# Patient Record
Sex: FEMALE | Race: BLACK OR AFRICAN AMERICAN | Employment: UNEMPLOYED | URBAN - METROPOLITAN AREA
[De-identification: names, ages, dates, MRNs, and addresses within clinical notes are randomized per-mention and may not be internally consistent; named-entity substitution may affect disease eponyms.]

---

## 2019-07-19 ENCOUNTER — APPOINTMENT (OUTPATIENT)
Dept: ULTRASOUND IMAGING | Age: 39
End: 2019-07-19
Payer: COMMERCIAL

## 2019-07-19 ENCOUNTER — HOSPITAL ENCOUNTER (EMERGENCY)
Age: 39
Discharge: HOME | End: 2019-07-19
Attending: EMERGENCY MEDICINE
Payer: COMMERCIAL

## 2019-07-19 ENCOUNTER — APPOINTMENT (OUTPATIENT)
Dept: CT IMAGING | Age: 39
End: 2019-07-19
Payer: COMMERCIAL

## 2019-07-19 VITALS
BODY MASS INDEX: 23.37 KG/M2 | RESPIRATION RATE: 16 BRPM | DIASTOLIC BLOOD PRESSURE: 78 MMHG | HEART RATE: 90 BPM | WEIGHT: 127 LBS | HEIGHT: 62 IN | TEMPERATURE: 98.1 F | SYSTOLIC BLOOD PRESSURE: 120 MMHG | OXYGEN SATURATION: 97 %

## 2019-07-19 DIAGNOSIS — R11.0 NAUSEA: ICD-10-CM

## 2019-07-19 DIAGNOSIS — R10.2 PELVIC PAIN: Primary | ICD-10-CM

## 2019-07-19 LAB
A/G RATIO: 1.1 (ref 1.1–2.2)
ALBUMIN SERPL-MCNC: 4.4 G/DL (ref 3.4–5)
ALP BLD-CCNC: 68 U/L (ref 40–129)
ALT SERPL-CCNC: 55 U/L (ref 10–40)
ANION GAP SERPL CALCULATED.3IONS-SCNC: 14 MMOL/L (ref 3–16)
AST SERPL-CCNC: 92 U/L (ref 15–37)
BASOPHILS ABSOLUTE: 0 K/UL (ref 0–0.2)
BASOPHILS RELATIVE PERCENT: 0.7 %
BILIRUB SERPL-MCNC: 1 MG/DL (ref 0–1)
BILIRUBIN URINE: ABNORMAL
BLOOD, URINE: NEGATIVE
BUN BLDV-MCNC: 7 MG/DL (ref 7–20)
CALCIUM SERPL-MCNC: 9.3 MG/DL (ref 8.3–10.6)
CHLORIDE BLD-SCNC: 102 MMOL/L (ref 99–110)
CLARITY: ABNORMAL
CO2: 22 MMOL/L (ref 21–32)
COLOR: ABNORMAL
CREAT SERPL-MCNC: <0.5 MG/DL (ref 0.6–1.1)
EOSINOPHILS ABSOLUTE: 0.1 K/UL (ref 0–0.6)
EOSINOPHILS RELATIVE PERCENT: 1.1 %
EPITHELIAL CELLS, UA: 3 /HPF (ref 0–5)
GFR AFRICAN AMERICAN: >60
GFR NON-AFRICAN AMERICAN: >60
GLOBULIN: 3.9 G/DL
GLUCOSE BLD-MCNC: 85 MG/DL (ref 70–99)
GLUCOSE URINE: NEGATIVE MG/DL
HCG(URINE) PREGNANCY TEST: NEGATIVE
HCT VFR BLD CALC: 41.3 % (ref 36–48)
HEMOGLOBIN: 13.4 G/DL (ref 12–16)
HYALINE CASTS: 8 /LPF (ref 0–8)
KETONES, URINE: ABNORMAL MG/DL
LEUKOCYTE ESTERASE, URINE: ABNORMAL
LYMPHOCYTES ABSOLUTE: 2.1 K/UL (ref 1–5.1)
LYMPHOCYTES RELATIVE PERCENT: 35.7 %
MCH RBC QN AUTO: 32.3 PG (ref 26–34)
MCHC RBC AUTO-ENTMCNC: 32.5 G/DL (ref 31–36)
MCV RBC AUTO: 99.3 FL (ref 80–100)
MICROSCOPIC EXAMINATION: YES
MONOCYTES ABSOLUTE: 0.4 K/UL (ref 0–1.3)
MONOCYTES RELATIVE PERCENT: 6 %
NEUTROPHILS ABSOLUTE: 3.4 K/UL (ref 1.7–7.7)
NEUTROPHILS RELATIVE PERCENT: 56.5 %
NITRITE, URINE: NEGATIVE
PDW BLD-RTO: 19.4 % (ref 12.4–15.4)
PH UA: 6.5 (ref 5–8)
PLATELET # BLD: 205 K/UL (ref 135–450)
PMV BLD AUTO: 8.4 FL (ref 5–10.5)
POTASSIUM SERPL-SCNC: 5.6 MMOL/L (ref 3.5–5.1)
PROTEIN UA: ABNORMAL MG/DL
RBC # BLD: 4.16 M/UL (ref 4–5.2)
RBC UA: 3 /HPF (ref 0–4)
SODIUM BLD-SCNC: 138 MMOL/L (ref 136–145)
SPECIFIC GRAVITY UA: 1.02 (ref 1–1.03)
TOTAL PROTEIN: 8.3 G/DL (ref 6.4–8.2)
URINE REFLEX TO CULTURE: YES
URINE TYPE: ABNORMAL
UROBILINOGEN, URINE: 1 E.U./DL
WBC # BLD: 6 K/UL (ref 4–11)
WBC UA: 10 /HPF (ref 0–5)

## 2019-07-19 PROCEDURE — 74177 CT ABD & PELVIS W/CONTRAST: CPT

## 2019-07-19 PROCEDURE — 6360000002 HC RX W HCPCS: Performed by: NURSE PRACTITIONER

## 2019-07-19 PROCEDURE — 81001 URINALYSIS AUTO W/SCOPE: CPT

## 2019-07-19 PROCEDURE — 87086 URINE CULTURE/COLONY COUNT: CPT

## 2019-07-19 PROCEDURE — 93975 VASCULAR STUDY: CPT

## 2019-07-19 PROCEDURE — 96361 HYDRATE IV INFUSION ADD-ON: CPT

## 2019-07-19 PROCEDURE — 6360000004 HC RX CONTRAST MEDICATION: Performed by: NURSE PRACTITIONER

## 2019-07-19 PROCEDURE — 99284 EMERGENCY DEPT VISIT MOD MDM: CPT

## 2019-07-19 PROCEDURE — 80053 COMPREHEN METABOLIC PANEL: CPT

## 2019-07-19 PROCEDURE — 85025 COMPLETE CBC W/AUTO DIFF WBC: CPT

## 2019-07-19 PROCEDURE — 96374 THER/PROPH/DIAG INJ IV PUSH: CPT

## 2019-07-19 PROCEDURE — 2580000003 HC RX 258: Performed by: NURSE PRACTITIONER

## 2019-07-19 PROCEDURE — 84703 CHORIONIC GONADOTROPIN ASSAY: CPT

## 2019-07-19 PROCEDURE — 96375 TX/PRO/DX INJ NEW DRUG ADDON: CPT

## 2019-07-19 PROCEDURE — 76830 TRANSVAGINAL US NON-OB: CPT

## 2019-07-19 PROCEDURE — 87077 CULTURE AEROBIC IDENTIFY: CPT

## 2019-07-19 RX ORDER — MORPHINE SULFATE 4 MG/ML
4 INJECTION, SOLUTION INTRAMUSCULAR; INTRAVENOUS
Status: DISCONTINUED | OUTPATIENT
Start: 2019-07-19 | End: 2019-07-20 | Stop reason: HOSPADM

## 2019-07-19 RX ORDER — KETOROLAC TROMETHAMINE 30 MG/ML
30 INJECTION, SOLUTION INTRAMUSCULAR; INTRAVENOUS ONCE
Status: COMPLETED | OUTPATIENT
Start: 2019-07-19 | End: 2019-07-19

## 2019-07-19 RX ORDER — 0.9 % SODIUM CHLORIDE 0.9 %
1000 INTRAVENOUS SOLUTION INTRAVENOUS ONCE
Status: COMPLETED | OUTPATIENT
Start: 2019-07-19 | End: 2019-07-19

## 2019-07-19 RX ORDER — DIPHENHYDRAMINE HYDROCHLORIDE 50 MG/ML
50 INJECTION INTRAMUSCULAR; INTRAVENOUS ONCE
Status: COMPLETED | OUTPATIENT
Start: 2019-07-19 | End: 2019-07-19

## 2019-07-19 RX ORDER — METOCLOPRAMIDE HYDROCHLORIDE 5 MG/ML
10 INJECTION INTRAMUSCULAR; INTRAVENOUS ONCE
Status: COMPLETED | OUTPATIENT
Start: 2019-07-19 | End: 2019-07-19

## 2019-07-19 RX ORDER — ONDANSETRON 2 MG/ML
4 INJECTION INTRAMUSCULAR; INTRAVENOUS EVERY 6 HOURS PRN
Status: DISCONTINUED | OUTPATIENT
Start: 2019-07-19 | End: 2019-07-20 | Stop reason: HOSPADM

## 2019-07-19 RX ORDER — ONDANSETRON 4 MG/1
4 TABLET, FILM COATED ORAL EVERY 8 HOURS PRN
Qty: 20 TABLET | Refills: 0 | Status: ON HOLD | OUTPATIENT
Start: 2019-07-19 | End: 2021-01-21 | Stop reason: HOSPADM

## 2019-07-19 RX ADMIN — DIPHENHYDRAMINE HYDROCHLORIDE 50 MG: 50 INJECTION, SOLUTION INTRAMUSCULAR; INTRAVENOUS at 20:07

## 2019-07-19 RX ADMIN — SODIUM CHLORIDE 1000 ML: 9 INJECTION, SOLUTION INTRAVENOUS at 18:56

## 2019-07-19 RX ADMIN — IOPAMIDOL 75 ML: 755 INJECTION, SOLUTION INTRAVENOUS at 19:52

## 2019-07-19 RX ADMIN — KETOROLAC TROMETHAMINE 30 MG: 30 INJECTION, SOLUTION INTRAMUSCULAR; INTRAVENOUS at 18:57

## 2019-07-19 RX ADMIN — ONDANSETRON 4 MG: 2 INJECTION INTRAMUSCULAR; INTRAVENOUS at 18:57

## 2019-07-19 RX ADMIN — METOCLOPRAMIDE 10 MG: 5 INJECTION, SOLUTION INTRAMUSCULAR; INTRAVENOUS at 20:07

## 2019-07-19 ASSESSMENT — ENCOUNTER SYMPTOMS
NAUSEA: 1
DIARRHEA: 0
CHEST TIGHTNESS: 0
SHORTNESS OF BREATH: 0
VOMITING: 0
ABDOMINAL PAIN: 0

## 2019-07-19 ASSESSMENT — PAIN SCALES - GENERAL: PAINLEVEL_OUTOF10: 7

## 2019-07-19 NOTE — ED PROVIDER NOTES
colic. Negative McBurney's point and not likely to be appendicitis. No LLQ abdominal pain that would indicate diverticulitis. There is no evidence of peritonitis, sepsis or toxicity at this time. Patient is advised to follow-up with her family doctor in 24-48 hours and return to the emergency department with any concerns. FINAL IMPRESSION      1. Pelvic pain    2. Nausea          DISPOSITION/PLAN   DISPOSITION Decision To Discharge 07/19/2019 09:34:23 PM      PATIENT REFERREDTO:  No follow-up provider specified.     DISCHARGE MEDICATIONS:  Discharge Medication List as of 7/19/2019  9:44 PM      START taking these medications    Details   ondansetron (ZOFRAN) 4 MG tablet Take 1 tablet by mouth every 8 hours as needed for Nausea, Disp-20 tablet, R-0Print             DISCONTINUED MEDICATIONS:  Discharge Medication List as of 7/19/2019  9:44 PM                 (Please note that portions ofthis note were completed with a voice recognition program.  Efforts were made to edit the dictations but occasionally words are mis-transcribed.)    SHUBHAM Hodges CNP (electronically signed)           SHUBHAM Hodges CNP  07/20/19 8991

## 2019-07-19 NOTE — ED NOTES
Bed: 05  Expected date:   Expected time:   Means of arrival: Walk In  Comments:     Neena Evans  07/19/19 7173

## 2019-07-20 NOTE — ED PROVIDER NOTES
dose to as low as reasonably achievable. COMPARISON: 09/24/2012 HISTORY: ORDERING SYSTEM PROVIDED HISTORY: right lower quad pain TECHNOLOGIST PROVIDED HISTORY: If patient is on cardiac monitor and/or pulse ox, they may be taken off cardiac monitor and pulse ox, left on O2 if currently on. All monitors reattached when patient returns to room. Additional Contrast?->None Reason for Exam: Abdominal Pain (pt with c/o lower abd pain/ nausea, poor appetite ongoing for past 2 weeks- no diarrhea, no fever) Acuity: Acute Type of Exam: Initial FINDINGS: Lower Chest: Lung bases clear Organs: Pronounced low-attenuation of the liver. Remaining solid organs and gallbladder unremarkable GI/Bowel: No gastrointestinal abnormality demonstrated. Appendix normal Pelvis: Reproductive organs within normal limits, with 1 cm irregular shaped rim enhancing right ovarian follicle compatible with recent ovulation. Urinary bladder unremarkable. No free pelvic fluid Peritoneum/Retroperitoneum: No ascites or pneumoperitoneum. Aorta unremarkable Bones/Soft Tissues: No bony abnormality     1. No acute process demonstrated 2.  Hepatic steatosis     Results for orders placed or performed during the hospital encounter of 07/19/19   Urine, reflex to culture   Result Value Ref Range    Color, UA DK YELLOW Straw/Yellow    Clarity, UA CLOUDY (A) Clear    Glucose, Ur Negative Negative mg/dL    Bilirubin Urine SMALL (A) Negative    Ketones, Urine TRACE (A) Negative mg/dL    Specific Gravity, UA 1.025 1.005 - 1.030    Blood, Urine Negative Negative    pH, UA 6.5 5.0 - 8.0    Protein, UA TRACE (A) Negative mg/dL    Urobilinogen, Urine 1.0 <2.0 E.U./dL    Nitrite, Urine Negative Negative    Leukocyte Esterase, Urine SMALL (A) Negative    Microscopic Examination YES     Urine Reflex to Culture Yes     Urine Type Not Specified    Pregnancy, Urine   Result Value Ref Range    HCG(Urine) Pregnancy Test Negative Detects HCG level >20 MIU/mL   Microscopic Urinalysis   Result Value Ref Range    Hyaline Casts, UA 8 0 - 8 /LPF    WBC, UA 10 (H) 0 - 5 /HPF    RBC, UA 3 0 - 4 /HPF    Epi Cells 3 0 - 5 /HPF   CBC Auto Differential   Result Value Ref Range    WBC 6.0 4.0 - 11.0 K/uL    RBC 4.16 4.00 - 5.20 M/uL    Hemoglobin 13.4 12.0 - 16.0 g/dL    Hematocrit 41.3 36.0 - 48.0 %    MCV 99.3 80.0 - 100.0 fL    MCH 32.3 26.0 - 34.0 pg    MCHC 32.5 31.0 - 36.0 g/dL    RDW 19.4 (H) 12.4 - 15.4 %    Platelets 058 718 - 136 K/uL    MPV 8.4 5.0 - 10.5 fL    Neutrophils % 56.5 %    Lymphocytes % 35.7 %    Monocytes % 6.0 %    Eosinophils % 1.1 %    Basophils % 0.7 %    Neutrophils # 3.4 1.7 - 7.7 K/uL    Lymphocytes # 2.1 1.0 - 5.1 K/uL    Monocytes # 0.4 0.0 - 1.3 K/uL    Eosinophils # 0.1 0.0 - 0.6 K/uL    Basophils # 0.0 0.0 - 0.2 K/uL   Comprehensive metabolic panel   Result Value Ref Range    Sodium 138 136 - 145 mmol/L    Potassium 5.6 (H) 3.5 - 5.1 mmol/L    Chloride 102 99 - 110 mmol/L    CO2 22 21 - 32 mmol/L    Anion Gap 14 3 - 16    Glucose 85 70 - 99 mg/dL    BUN 7 7 - 20 mg/dL    CREATININE <0.5 (L) 0.6 - 1.1 mg/dL    GFR Non-African American >60 >60    GFR African American >60 >60    Calcium 9.3 8.3 - 10.6 mg/dL    Total Protein 8.3 (H) 6.4 - 8.2 g/dL    Alb 4.4 3.4 - 5.0 g/dL    Albumin/Globulin Ratio 1.1 1.1 - 2.2    Total Bilirubin 1.0 0.0 - 1.0 mg/dL    Alkaline Phosphatase 68 40 - 129 U/L    ALT 55 (H) 10 - 40 U/L    AST 92 (H) 15 - 37 U/L    Globulin 3.9 g/dL           Laquita Hides, MD  07/19/19 9631

## 2019-07-22 LAB — URINE CULTURE, ROUTINE: NORMAL

## 2020-11-12 ENCOUNTER — APPOINTMENT (OUTPATIENT)
Dept: CT IMAGING | Age: 40
End: 2020-11-12
Payer: COMMERCIAL

## 2020-11-12 ENCOUNTER — HOSPITAL ENCOUNTER (EMERGENCY)
Age: 40
Discharge: HOME | End: 2020-11-13
Attending: STUDENT IN AN ORGANIZED HEALTH CARE EDUCATION/TRAINING PROGRAM
Payer: COMMERCIAL

## 2020-11-12 ENCOUNTER — APPOINTMENT (OUTPATIENT)
Dept: ULTRASOUND IMAGING | Age: 40
End: 2020-11-12
Payer: COMMERCIAL

## 2020-11-12 LAB
A/G RATIO: 1.4 (ref 1.1–2.2)
ALBUMIN SERPL-MCNC: 4.1 G/DL (ref 3.4–5)
ALP BLD-CCNC: 68 U/L (ref 40–129)
ALT SERPL-CCNC: 69 U/L (ref 10–40)
ANION GAP SERPL CALCULATED.3IONS-SCNC: 12 MMOL/L (ref 3–16)
AST SERPL-CCNC: 136 U/L (ref 15–37)
BACTERIA: ABNORMAL /HPF
BASOPHILS ABSOLUTE: 0 K/UL (ref 0–0.2)
BASOPHILS RELATIVE PERCENT: 0.6 %
BILIRUB SERPL-MCNC: 0.6 MG/DL (ref 0–1)
BILIRUBIN URINE: ABNORMAL
BLOOD, URINE: NEGATIVE
BUN BLDV-MCNC: 11 MG/DL (ref 7–20)
CALCIUM SERPL-MCNC: 9.5 MG/DL (ref 8.3–10.6)
CHLORIDE BLD-SCNC: 102 MMOL/L (ref 99–110)
CLARITY: ABNORMAL
CO2: 24 MMOL/L (ref 21–32)
COLOR: ABNORMAL
CREAT SERPL-MCNC: 0.7 MG/DL (ref 0.6–1.1)
EOSINOPHILS ABSOLUTE: 0.1 K/UL (ref 0–0.6)
EOSINOPHILS RELATIVE PERCENT: 1.9 %
EPITHELIAL CELLS, UA: 7 /HPF (ref 0–5)
GFR AFRICAN AMERICAN: >60
GFR NON-AFRICAN AMERICAN: >60
GLOBULIN: 2.9 G/DL
GLUCOSE BLD-MCNC: 93 MG/DL (ref 70–99)
GLUCOSE URINE: NEGATIVE MG/DL
HCG QUALITATIVE: NEGATIVE
HCT VFR BLD CALC: 35.4 % (ref 36–48)
HEMOGLOBIN: 12.2 G/DL (ref 12–16)
HYALINE CASTS: 17 /LPF (ref 0–8)
KETONES, URINE: 40 MG/DL
LEUKOCYTE ESTERASE, URINE: ABNORMAL
LIPASE: 16 U/L (ref 13–60)
LYMPHOCYTES ABSOLUTE: 1.1 K/UL (ref 1–5.1)
LYMPHOCYTES RELATIVE PERCENT: 21.5 %
MCH RBC QN AUTO: 32.4 PG (ref 26–34)
MCHC RBC AUTO-ENTMCNC: 34.4 G/DL (ref 31–36)
MCV RBC AUTO: 94 FL (ref 80–100)
MICROSCOPIC EXAMINATION: YES
MONOCYTES ABSOLUTE: 0.4 K/UL (ref 0–1.3)
MONOCYTES RELATIVE PERCENT: 8.1 %
NEUTROPHILS ABSOLUTE: 3.4 K/UL (ref 1.7–7.7)
NEUTROPHILS RELATIVE PERCENT: 67.9 %
NITRITE, URINE: NEGATIVE
PDW BLD-RTO: 21.6 % (ref 12.4–15.4)
PH UA: 6.5 (ref 5–8)
PLATELET # BLD: 208 K/UL (ref 135–450)
PMV BLD AUTO: 7.8 FL (ref 5–10.5)
POTASSIUM SERPL-SCNC: 3.5 MMOL/L (ref 3.5–5.1)
PROTEIN UA: 30 MG/DL
RBC # BLD: 3.77 M/UL (ref 4–5.2)
RBC UA: 5 /HPF (ref 0–4)
SODIUM BLD-SCNC: 138 MMOL/L (ref 136–145)
SPECIFIC GRAVITY UA: >1.03 (ref 1–1.03)
TOTAL PROTEIN: 7 G/DL (ref 6.4–8.2)
URINE REFLEX TO CULTURE: YES
URINE TYPE: ABNORMAL
UROBILINOGEN, URINE: 1 E.U./DL
WBC # BLD: 5 K/UL (ref 4–11)
WBC UA: 25 /HPF (ref 0–5)

## 2020-11-12 PROCEDURE — 81001 URINALYSIS AUTO W/SCOPE: CPT

## 2020-11-12 PROCEDURE — 76830 TRANSVAGINAL US NON-OB: CPT

## 2020-11-12 PROCEDURE — 6360000002 HC RX W HCPCS: Performed by: PHYSICIAN ASSISTANT

## 2020-11-12 PROCEDURE — 87077 CULTURE AEROBIC IDENTIFY: CPT

## 2020-11-12 PROCEDURE — 84703 CHORIONIC GONADOTROPIN ASSAY: CPT

## 2020-11-12 PROCEDURE — 96374 THER/PROPH/DIAG INJ IV PUSH: CPT

## 2020-11-12 PROCEDURE — 87086 URINE CULTURE/COLONY COUNT: CPT

## 2020-11-12 PROCEDURE — 2580000003 HC RX 258: Performed by: PHYSICIAN ASSISTANT

## 2020-11-12 PROCEDURE — 85025 COMPLETE CBC W/AUTO DIFF WBC: CPT

## 2020-11-12 PROCEDURE — 83690 ASSAY OF LIPASE: CPT

## 2020-11-12 PROCEDURE — 99283 EMERGENCY DEPT VISIT LOW MDM: CPT

## 2020-11-12 PROCEDURE — 96375 TX/PRO/DX INJ NEW DRUG ADDON: CPT

## 2020-11-12 PROCEDURE — 87186 SC STD MICRODIL/AGAR DIL: CPT

## 2020-11-12 PROCEDURE — 96361 HYDRATE IV INFUSION ADD-ON: CPT

## 2020-11-12 PROCEDURE — 80053 COMPREHEN METABOLIC PANEL: CPT

## 2020-11-12 RX ORDER — ONDANSETRON 2 MG/ML
4 INJECTION INTRAMUSCULAR; INTRAVENOUS ONCE
Status: COMPLETED | OUTPATIENT
Start: 2020-11-12 | End: 2020-11-12

## 2020-11-12 RX ORDER — KETOROLAC TROMETHAMINE 30 MG/ML
30 INJECTION, SOLUTION INTRAMUSCULAR; INTRAVENOUS ONCE
Status: COMPLETED | OUTPATIENT
Start: 2020-11-12 | End: 2020-11-13

## 2020-11-12 RX ORDER — 0.9 % SODIUM CHLORIDE 0.9 %
1000 INTRAVENOUS SOLUTION INTRAVENOUS ONCE
Status: COMPLETED | OUTPATIENT
Start: 2020-11-12 | End: 2020-11-13

## 2020-11-12 RX ORDER — MORPHINE SULFATE 4 MG/ML
4 INJECTION, SOLUTION INTRAMUSCULAR; INTRAVENOUS ONCE
Status: COMPLETED | OUTPATIENT
Start: 2020-11-12 | End: 2020-11-12

## 2020-11-12 RX ADMIN — ONDANSETRON 4 MG: 2 INJECTION INTRAMUSCULAR; INTRAVENOUS at 23:59

## 2020-11-12 RX ADMIN — MORPHINE SULFATE 4 MG: 4 INJECTION INTRAVENOUS at 23:53

## 2020-11-12 RX ADMIN — SODIUM CHLORIDE 1000 ML: 9 INJECTION, SOLUTION INTRAVENOUS at 23:50

## 2020-11-12 ASSESSMENT — ENCOUNTER SYMPTOMS
VOMITING: 0
ABDOMINAL PAIN: 1
NAUSEA: 1
WHEEZING: 0
RHINORRHEA: 0
DIARRHEA: 0
SHORTNESS OF BREATH: 0
COUGH: 0

## 2020-11-12 ASSESSMENT — PAIN SCALES - GENERAL
PAINLEVEL_OUTOF10: 8
PAINLEVEL_OUTOF10: 8

## 2020-11-13 ENCOUNTER — APPOINTMENT (OUTPATIENT)
Dept: CT IMAGING | Age: 40
End: 2020-11-13
Payer: COMMERCIAL

## 2020-11-13 VITALS
DIASTOLIC BLOOD PRESSURE: 58 MMHG | BODY MASS INDEX: 23.92 KG/M2 | HEART RATE: 69 BPM | OXYGEN SATURATION: 100 % | RESPIRATION RATE: 14 BRPM | HEIGHT: 62 IN | TEMPERATURE: 99 F | SYSTOLIC BLOOD PRESSURE: 118 MMHG | WEIGHT: 130 LBS

## 2020-11-13 PROCEDURE — 96375 TX/PRO/DX INJ NEW DRUG ADDON: CPT

## 2020-11-13 PROCEDURE — 6360000002 HC RX W HCPCS: Performed by: PHYSICIAN ASSISTANT

## 2020-11-13 PROCEDURE — 6360000004 HC RX CONTRAST MEDICATION: Performed by: STUDENT IN AN ORGANIZED HEALTH CARE EDUCATION/TRAINING PROGRAM

## 2020-11-13 PROCEDURE — 74177 CT ABD & PELVIS W/CONTRAST: CPT

## 2020-11-13 RX ORDER — SULFAMETHOXAZOLE AND TRIMETHOPRIM 800; 160 MG/1; MG/1
1 TABLET ORAL 2 TIMES DAILY
Qty: 14 TABLET | Refills: 0 | Status: SHIPPED | OUTPATIENT
Start: 2020-11-13 | End: 2020-11-20

## 2020-11-13 RX ADMIN — KETOROLAC TROMETHAMINE 30 MG: 30 INJECTION, SOLUTION INTRAMUSCULAR at 00:08

## 2020-11-13 RX ADMIN — IOPAMIDOL 75 ML: 755 INJECTION, SOLUTION INTRAVENOUS at 01:13

## 2020-11-13 RX ADMIN — Medication 1 G: at 01:49

## 2020-11-13 ASSESSMENT — PAIN SCALES - GENERAL: PAINLEVEL_OUTOF10: 8

## 2020-11-13 NOTE — ED PROVIDER NOTES
Musculoskeletal: Normal range of motion and neck supple. Cardiovascular:      Rate and Rhythm: Normal rate. Heart sounds: Normal heart sounds. Pulmonary:      Effort: Pulmonary effort is normal. No respiratory distress. Breath sounds: Normal breath sounds. Chest:      Chest wall: No tenderness. Abdominal:      General: Abdomen is flat. Bowel sounds are normal. There is no distension. Palpations: Abdomen is soft. There is no mass. Tenderness: There is abdominal tenderness. There is guarding. There is no right CVA tenderness, left CVA tenderness or rebound. Hernia: No hernia is present. Musculoskeletal: Normal range of motion. Skin:     General: Skin is warm and dry. Neurological:      Mental Status: She is alert and oriented to person, place, and time.    Psychiatric:         Behavior: Behavior normal.         DIAGNOSTIC RESULTS   LABS:    Labs Reviewed   CBC WITH AUTO DIFFERENTIAL - Abnormal; Notable for the following components:       Result Value    RBC 3.77 (*)     Hematocrit 35.4 (*)     RDW 21.6 (*)     All other components within normal limits    Narrative:     Performed at:  [facility] [address] [facility]   Phone [phone]   COMPREHENSIVE METABOLIC PANEL - Abnormal; Notable for the following components:    ALT 69 (*)      (*)     All other components within normal limits    Narrative:     Performed at:  [facility] [address] [address]   Phone [phone]   URINE RT REFLEX TO CULTURE - Abnormal; Notable for the following components:    Clarity, UA CLOUDY (*)     Bilirubin Urine MODERATE (*)     Ketones, Urine 40 (*)     Protein, UA 30 (*)     Leukocyte Esterase, Urine SMALL (*)     All other components within normal limits    Narrative:     Performed at:  [facility] [address] [facility] [employer]   Phone [phone]   MICROSCOPIC URINALYSIS - Abnormal; Notable for the following components:    Bacteria, UA RARE (*)     Hyaline Casts, UA 17 (*)     WBC, UA 25 (*)     RBC, UA 5 (*)     Epithelial Cells, UA 7 (*)     All other components within normal limits    Narrative:     Performed at:  [facility] [address] [facility],  [place], [address]   Phone [phone]   CULTURE, URINE   HCG, SERUM, QUALITATIVE    Narrative:     Performed at:  [facility] [address],  [place], [address]   Phone [phone]   LIPASE    Narrative:     Performed at:  [facility] [address] [facility] [address]   Phone [phone]       All other labs were within normal range or not returned as of this dictation. EKG: All EKG's are interpreted by the Emergency Department Physician in the absence of a cardiologist.  Please see their note for interpretation of EKG. RADIOLOGY:   Non-plain film images such as CT, Ultrasound and MRI are read by the radiologist. Plain radiographic images are visualized and preliminarily interpreted by the ED Provider with the below findings:        Interpretation per the Radiologist below, if available at the time of this note:    US NON OB TRANSVAGINAL   Final Result   23 mm right ovarian follicular cyst, possibly hemorrhagic given low-level   internal echoes. Normal blood flow to the ovary. Nonvisualized left ovary. Submucosal fibroid suspected. Uterus is otherwise unremarkable. RECOMMENDATIONS:   Recommend pelvic US follow-up in 6-12 weeks; if unchanged, continue follow-up   with US OR MRI with IV contrast - if follow-up studies do not confirm   endometrioma or dermoid, consider surgical evaluation. If cyst has internal   nodule without blood flow/enhancing, recommend pelvic MRI with IV contrast or   surgical evaluation.       Reference: Radiology 2010 Sep;256(3):943-54         CT hemorrhagic. Normal blood flow to the ovary. Pelvic ultrasound follow-up is recommended in 6 to 12 weeks. CT the abdomen pelvis was also ordered to evaluate possibility of other intra-abdominal process and source of transaminitis. Results are pending end of shift. Please see attending note for additional information regarding these results, patient reevaluation and disposition. FINAL IMPRESSION      1. Hemorrhagic ovarian cyst    2. Abdominal pain, right lower quadrant    3. Transaminitis          DISPOSITION/PLAN   DISPOSITION        PATIENT REFERREDTO:  No follow-up provider specified.     DISCHARGE MEDICATIONS:  New Prescriptions    No medications on file       DISCONTINUED MEDICATIONS:  Discontinued Medications    No medications on file              (Please note that portions of this note were completed with a voice recognition program.  Efforts were made to edit the dictations but occasionally words are mis-transcribed.)    Sri Cervantes PA-C (electronically signed)           Derick Tolliver PA-C  11/13/20 7411

## 2020-11-13 NOTE — ED NOTES
Pt had a panic attack after morphine given, pt placed on NC per charting, pt given cold cloth on head, encourgaed to take deep breaths, rn at bs. Vss, per pt she has not had narcotics, vss, pt laid down, lights off, pt relaxed and no complications .  Will wean off O2     Andrea Rodriguez RN  11/13/20 1090

## 2020-11-13 NOTE — ED PROVIDER NOTES
I independently performed a history and physical on Panchito Cardoso. All diagnostic, treatment, and disposition decisions were made by myself in conjunction with the advanced practice provider. Briefly, this is a 36 y.o. female here for abdominal pain, nausea and dysuria. She feels like she cannot completely empty her bladder and is endorsing some suprapubic tenderness. Does have a history of ovarian cysts and her pain feels similar to her prior ruptured ovarian cyst.    On exam pt is resting comfortably  Cardiac RRR, no murmur  Lungs clear bilaterally, no increased work of breathing  Abdomen soft +RLQ tenderness no guarding  No calf edema or tenderness  Neuro no drift in extremities         CT ABDOMEN PELVIS W IV CONTRAST Additional Contrast? None   Final Result   Cystitis without evidence of pyelonephritis or obstructive uropathy. Right ovarian follicular cyst and submucosal uterine fibroid correlate with   recent pelvic ultrasound findings. The left ovary was not seen on that exam,   however appears normal on this exam.      Hepatic steatosis. US NON OB TRANSVAGINAL   Final Result   23 mm right ovarian follicular cyst, possibly hemorrhagic given low-level   internal echoes. Normal blood flow to the ovary. Nonvisualized left ovary. Submucosal fibroid suspected. Uterus is otherwise unremarkable. RECOMMENDATIONS:   Recommend pelvic US follow-up in 6-12 weeks; if unchanged, continue follow-up   with US OR MRI with IV contrast - if follow-up studies do not confirm   endometrioma or dermoid, consider surgical evaluation. If cyst has internal   nodule without blood flow/enhancing, recommend pelvic MRI with IV contrast or   surgical evaluation.       Reference: Radiology 2010 Sep;256(3):943-54                Labs Reviewed   CBC WITH AUTO DIFFERENTIAL - Abnormal; Notable for the following components:       Result Value    RBC 3.77 (*)     Hematocrit 35.4 (*)     RDW 21.6 (*)     All other components within normal limits    Narrative:     Performed at:  [facility] [address] [facility] [place],  [place], [facility] [employer]   Phone [phone]   COMPREHENSIVE METABOLIC PANEL - Abnormal; Notable for the following components:    ALT 69 (*)      (*)     All other components within normal limits    Narrative:     Performed at:  [facility] [address] [place]  [place], [facility] [employer]   Phone [phone]   URINE RT REFLEX TO CULTURE - Abnormal; Notable for the following components:    Clarity, UA CLOUDY (*)     Bilirubin Urine MODERATE (*)     Ketones, Urine 40 (*)     Protein, UA 30 (*)     Leukocyte Esterase, Urine SMALL (*)     All other components within normal limits    Narrative:     Performed at:  [facility] [address] [facility] [place]  [place], [facility] [employer]   Phone [phone]   MICROSCOPIC URINALYSIS - Abnormal; Notable for the following components:    Bacteria, UA RARE (*)     Hyaline Casts, UA 17 (*)     WBC, UA 25 (*)     RBC, UA 5 (*)     Epithelial Cells, UA 7 (*)     All other components within normal limits    Narrative:     Performed at:  [facility] [address] [facility] [place],  [place], [facility] [employer]   Phone [phone]   CULTURE, URINE   HCG, SERUM, QUALITATIVE    Narrative:     Performed at:  [facility] [address] [place],  [place], [facility] [employer]   Phone [phone]   LIPASE    Narrative:     Performed at:  [facility] [address] [facility] [employer], [facility] [employer]   Phone [phone]       CBC: no clinically significant anemia, leukocytosis, thrombocytopeniaBMP: no clinically significant electrolyte derangement or AMI  UA c/w infection   Lipase not elevated  Mild transaminitis        Patient presents with left lower quadrant abdominal pain. On arrival she appears comfortable and is hemodynamically stable without fever.   She does have some focal right lower quadrant tenderness without peritoneal signs. Does have a history of hemorrhagic ovarian cysts. Work-up today showing new mild transaminitis and positive UTI. Pelvic ultrasound shows unruptured ovarian cyst with normal blood flow to both ovaries and no significant pelvic free fluid. There is no signs of appendicitis noted on CT. She is noted to have some hepatic steatosis changes. No signs of cholecystitis. Will need routine follow-up for the ovarian cyst as well as the steatosis. Is to follow-up with OB/GYN and PCP in 1 week. Otherwise she is to return to the ER for any worsening abdominal pain, lightheadedness, dizziness or inability to tolerate p.o. She is agreeable to plan. Patient Referrals:  Heri Arshad MD  [address] [facility]  [phone]    In 1 week        Discharge Medications:  New Prescriptions    SULFAMETHOXAZOLE-TRIMETHOPRIM (BACTRIM DS) 800-160 MG PER TABLET    Take 1 tablet by mouth 2 times daily for 7 days       FINAL IMPRESSION  1. Hemorrhagic ovarian cyst    2. Abdominal pain, right lower quadrant    3. Transaminitis        Blood pressure (!) 118/58, pulse 69, temperature 99 °F (37.2 °C), temperature source Oral, resp. rate 14, height 5' 2\" (1.575 m), weight 130 lb (59 kg), SpO2 100 %.      For further details of [facility] emergency department encounter, please see documentation by advanced practice provider       Stas Velez MD  11/13/20 0700

## 2020-11-14 LAB
ORGANISM: ABNORMAL
URINE CULTURE, ROUTINE: ABNORMAL
URINE CULTURE, ROUTINE: ABNORMAL

## 2021-01-04 NOTE — PROGRESS NOTES
Dr Gregory Gentile office made aware- all numbers for patient-states unavailable-unable to leave message

## 2021-01-15 ENCOUNTER — OFFICE VISIT (OUTPATIENT)
Dept: PRIMARY CARE CLINIC | Age: 41
End: 2021-01-15
Payer: COMMERCIAL

## 2021-01-15 DIAGNOSIS — Z20.828 EXPOSURE TO SARS-ASSOCIATED CORONAVIRUS: Primary | ICD-10-CM

## 2021-01-15 PROCEDURE — 99211 OFF/OP EST MAY X REQ PHY/QHP: CPT | Performed by: NURSE PRACTITIONER

## 2021-01-15 NOTE — PATIENT INSTRUCTIONS

## 2021-01-16 LAB — SARS-COV-2: NOT DETECTED

## 2021-01-19 ENCOUNTER — ANESTHESIA EVENT (OUTPATIENT)
Dept: OPERATING ROOM | Age: 41
DRG: 743 | End: 2021-01-19
Payer: COMMERCIAL

## 2021-01-19 NOTE — PROGRESS NOTES
Name_______________________________________Printed:____________________  Date and time of gilsrsp_0- 0630 MAIN_______________________Arrival Time:__0630 MAIN______________   1. The instructions given regarding when and if a patient needs to stop oral intake prior to surgery varies. Follow the specific instructions you were given                  __X_Nothing to eat or to drink after Midnight the night before. __X__Carbo loading or ERAS instructions will be given to select patients-if you have been given those instructions -please do the following                           The evening before your surgery after dinner before midnight drink 40 ounces of gatorade. If you are diabetic use sugar free. The morning of surgery drink 40 ounces of water. This needs to be finished 3 hours prior to your surgery start time. 2. Take the following pills with a small sip of water on the morning of surgery__NONE_________________________________________________                  Do not take blood pressure medications ending in pril or sartan the cata prior to surgery or the morning of surgery_   3. Aspirin, Ibuprofen, Advil, Naproxen, Vitamin E and other Anti-inflammatory products and supplements should be stopped for 5 -7days before surgery or as directed by your physician. 4. Check with your Doctor regarding stopping Plavix, Coumadin,Eliquis, Lovenox,Effient,Pradaxa,Xarelto, Fragmin or other blood thinners and follow their instructions. 5. Do not smoke, and do not drink any alcoholic beverages 24 hours prior to surgery. This includes NA Beer. Refrain from the usage of any recreational drugs. 6. You may brush your teeth and gargle the morning of surgery. DO NOT SWALLOW WATER   7. You MUST make arrangements for a responsible adult to stay on site while you are here and take you home after your surgery. You will not be allowed to leave alone or drive yourself home.   It is strongly suggested someone stay with you the first 24 hrs. Your surgery will be cancelled if you do not have a ride home. 8. A parent/legal guardian must accompany a child scheduled for surgery and plan to stay at the hospital until the child is discharged. Please do not bring other children with you. 9. Please wear simple, loose fitting clothing to the hospital.  Gari Sandifer not bring valuables (money, credit cards, checkbooks, etc.) Do not wear any makeup (including no eye makeup) or nail polish on your fingers or toes. 10. DO NOT wear any jewelry or piercings on day of surgery. All body piercing jewelry must be removed. 11. If you have ___dentures, they will be removed before going to the OR; we will provide you a container. If you wear ___contact lenses or ___glasses, they will be removed; please bring a case for them. 12. Please see your family doctor/pediatrician for a history & physical and/or concerning medications. Bring any test results/reports from your physician's office. PCP__________________Phone___________H&P Appt. Date________             13 If you  have a Living Will and Durable Power of  for Healthcare, please bring in a copy. 15. Notify your Surgeon if you develop any illness between now and surgery  time, cough, cold, fever, sore throat, nausea, vomiting, etc.  Please notify your surgeon if you experience dizziness, shortness of breath or blurred vision between now & the time of your surgery             15. DO NOT shave your operative site 96 hours prior to surgery. For face & neck surgery, men may use an electric razor 48 hours prior to surgery. 16. Shower the night before or morning of surgery using an antibacterial soap or as you have been instructed. 17. To provide excellent care visitors will be limited to one in the room at any given time. 18.  Please bring picture ID and insurance card.              19.  Visit our web site for additional information:  [email or web address]              20.During flu season no children under the age of 15 are permitted in the hospital for the safety of all patients. 21. If you take a long acting insulin in the evening only  take half of your usual  dose the night  before your procedure              22. If you use a c-pap please bring DOS if staying overnight,             23.For your convenience [facility] has a pharmacy on site to fill your prescriptions. 24. If you use oxygen and have a portable tank please bring it  with you the DOS             25. Bring a complete list of all your medications with name and dose include any supplements. 26. Other__________________________________________   *Please call pre admission testing if you any further questions   Renato Bartlett   [address]    [place] [address]    _X_ Done-Where MFF_____  __ Scheduled ___ Where ___   __ Other __________      IFDCCFU POLICY(subject to change)    There is a one visitor policy at [facility] for all surgeries and endoscopies. Whether the visitor can stay or will be asked to wait in the car will depend on the current policy and if social distancing can be maintained. The policy is subject to change at any time. Please make sure the visitor has a cell phone that is on,charged and able to accept calls, as this may be the way that the staff communicates with them. Pain management is NO VISITOR policyThe patients ride is expected to remain in the car with a cell phone for communication. If the ride is leaving the hospital grounds please make sure they are back in time for pickup. Have the patient inform the staff on arrival what their rides plans are while the patient is in the facility. At the MAIN there is one visitor allowed. Please note that the visitor policy is subject to change.        All above information reviewed with patient in person or by phone. Patient verbalizes understanding. All questions and concerns addressed.                                                                                                  Patient/Rep___PT_________________                                                                                                                                    PRE OP INSTRUCTIONS

## 2021-01-19 NOTE — PROGRESS NOTES
ANNALEE AT DR Gaitan July OFFICE CALLED REGARDING UNABLE TO REACH PATIENT. HER SURGERY IS SCHEDULED FOR TOMORROW, 1/20/2021. WILL CONTINUE TO TRY AND REACH PATIENT TODAY.

## 2021-01-19 NOTE — PROGRESS NOTES
Patient never reached. Multiple calls placed, unable to leave message. Office notified. Office has no additional phone numbers. Patient called after hours and left same [phone] #, but \"wireless caller not available\" message received. No medication orders placed since unable to verify allergy information.

## 2021-01-20 ENCOUNTER — ANESTHESIA (OUTPATIENT)
Dept: OPERATING ROOM | Age: 41
DRG: 743 | End: 2021-01-20
Payer: COMMERCIAL

## 2021-01-20 ENCOUNTER — HOSPITAL ENCOUNTER (INPATIENT)
Age: 41
LOS: 1 days | Discharge: HOME | DRG: 743 | End: 2021-01-21
Attending: OBSTETRICS & GYNECOLOGY | Admitting: OBSTETRICS & GYNECOLOGY
Payer: COMMERCIAL

## 2021-01-20 VITALS
OXYGEN SATURATION: 99 % | RESPIRATION RATE: 1 BRPM | SYSTOLIC BLOOD PRESSURE: 140 MMHG | TEMPERATURE: 97.3 F | DIASTOLIC BLOOD PRESSURE: 66 MMHG

## 2021-01-20 DIAGNOSIS — Z98.890 S/P LAPAROTOMY: ICD-10-CM

## 2021-01-20 DIAGNOSIS — Z90.710 S/P TOTAL HYSTERECTOMY: Primary | ICD-10-CM

## 2021-01-20 PROBLEM — N94.6 DYSMENORRHEA: Status: ACTIVE | Noted: 2021-01-20

## 2021-01-20 PROBLEM — D21.9 FIBROIDS: Status: ACTIVE | Noted: 2021-01-20

## 2021-01-20 PROBLEM — N93.9 ABNORMAL UTERINE BLEEDING: Status: ACTIVE | Noted: 2021-01-20

## 2021-01-20 LAB
ABO/RH: NORMAL
ANTIBODY SCREEN: NORMAL
BASOPHILS ABSOLUTE: 0 K/UL (ref 0–0.2)
BASOPHILS RELATIVE PERCENT: 0.7 %
EOSINOPHILS ABSOLUTE: 0.2 K/UL (ref 0–0.6)
EOSINOPHILS RELATIVE PERCENT: 2.4 %
HCG(URINE) PREGNANCY TEST: NEGATIVE
HCT VFR BLD CALC: 33.8 % (ref 36–48)
HEMOGLOBIN: 11.1 G/DL (ref 12–16)
LYMPHOCYTES ABSOLUTE: 2.4 K/UL (ref 1–5.1)
LYMPHOCYTES RELATIVE PERCENT: 34.5 %
MCH RBC QN AUTO: 32.1 PG (ref 26–34)
MCHC RBC AUTO-ENTMCNC: 32.9 G/DL (ref 31–36)
MCV RBC AUTO: 97.6 FL (ref 80–100)
MONOCYTES ABSOLUTE: 0.5 K/UL (ref 0–1.3)
MONOCYTES RELATIVE PERCENT: 7.8 %
NEUTROPHILS ABSOLUTE: 3.8 K/UL (ref 1.7–7.7)
NEUTROPHILS RELATIVE PERCENT: 54.6 %
PDW BLD-RTO: 21.4 % (ref 12.4–15.4)
PLATELET # BLD: 216 K/UL (ref 135–450)
PMV BLD AUTO: 8 FL (ref 5–10.5)
RBC # BLD: 3.46 M/UL (ref 4–5.2)
WBC # BLD: 6.9 K/UL (ref 4–11)

## 2021-01-20 PROCEDURE — 3600000015 HC SURGERY LEVEL 5 ADDTL 15MIN: Performed by: OBSTETRICS & GYNECOLOGY

## 2021-01-20 PROCEDURE — 6360000002 HC RX W HCPCS: Performed by: FAMILY MEDICINE

## 2021-01-20 PROCEDURE — 6370000000 HC RX 637 (ALT 250 FOR IP): Performed by: FAMILY MEDICINE

## 2021-01-20 PROCEDURE — 2580000003 HC RX 258: Performed by: OBSTETRICS & GYNECOLOGY

## 2021-01-20 PROCEDURE — 3600000005 HC SURGERY LEVEL 5 BASE: Performed by: OBSTETRICS & GYNECOLOGY

## 2021-01-20 PROCEDURE — 85025 COMPLETE CBC W/AUTO DIFF WBC: CPT

## 2021-01-20 PROCEDURE — 7100000001 HC PACU RECOVERY - ADDTL 15 MIN: Performed by: OBSTETRICS & GYNECOLOGY

## 2021-01-20 PROCEDURE — 6360000002 HC RX W HCPCS: Performed by: OBSTETRICS & GYNECOLOGY

## 2021-01-20 PROCEDURE — 6360000002 HC RX W HCPCS: Performed by: NURSE ANESTHETIST, CERTIFIED REGISTERED

## 2021-01-20 PROCEDURE — 0UT90ZZ RESECTION OF UTERUS, OPEN APPROACH: ICD-10-PCS | Performed by: OBSTETRICS & GYNECOLOGY

## 2021-01-20 PROCEDURE — 86900 BLOOD TYPING SEROLOGIC ABO: CPT

## 2021-01-20 PROCEDURE — 6370000000 HC RX 637 (ALT 250 FOR IP): Performed by: NURSE ANESTHETIST, CERTIFIED REGISTERED

## 2021-01-20 PROCEDURE — 6370000000 HC RX 637 (ALT 250 FOR IP): Performed by: OBSTETRICS & GYNECOLOGY

## 2021-01-20 PROCEDURE — 36415 COLL VENOUS BLD VENIPUNCTURE: CPT

## 2021-01-20 PROCEDURE — 3700000001 HC ADD 15 MINUTES (ANESTHESIA): Performed by: OBSTETRICS & GYNECOLOGY

## 2021-01-20 PROCEDURE — 0UTC7ZZ RESECTION OF CERVIX, VIA NATURAL OR ARTIFICIAL OPENING: ICD-10-PCS | Performed by: OBSTETRICS & GYNECOLOGY

## 2021-01-20 PROCEDURE — 2500000003 HC RX 250 WO HCPCS: Performed by: OBSTETRICS & GYNECOLOGY

## 2021-01-20 PROCEDURE — 86901 BLOOD TYPING SEROLOGIC RH(D): CPT

## 2021-01-20 PROCEDURE — 7100000000 HC PACU RECOVERY - FIRST 15 MIN: Performed by: OBSTETRICS & GYNECOLOGY

## 2021-01-20 PROCEDURE — 86850 RBC ANTIBODY SCREEN: CPT

## 2021-01-20 PROCEDURE — 2709999900 HC NON-CHARGEABLE SUPPLY: Performed by: OBSTETRICS & GYNECOLOGY

## 2021-01-20 PROCEDURE — 88307 TISSUE EXAM BY PATHOLOGIST: CPT

## 2021-01-20 PROCEDURE — 84703 CHORIONIC GONADOTROPIN ASSAY: CPT

## 2021-01-20 PROCEDURE — 3700000000 HC ANESTHESIA ATTENDED CARE: Performed by: OBSTETRICS & GYNECOLOGY

## 2021-01-20 PROCEDURE — 6360000002 HC RX W HCPCS

## 2021-01-20 PROCEDURE — 1200000000 HC SEMI PRIVATE

## 2021-01-20 PROCEDURE — 2500000003 HC RX 250 WO HCPCS: Performed by: NURSE ANESTHETIST, CERTIFIED REGISTERED

## 2021-01-20 RX ORDER — MAGNESIUM SULFATE HEPTAHYDRATE 500 MG/ML
INJECTION, SOLUTION INTRAMUSCULAR; INTRAVENOUS PRN
Status: DISCONTINUED | OUTPATIENT
Start: 2021-01-20 | End: 2021-01-20 | Stop reason: SDUPTHER

## 2021-01-20 RX ORDER — PROPOFOL 10 MG/ML
INJECTION, EMULSION INTRAVENOUS PRN
Status: DISCONTINUED | OUTPATIENT
Start: 2021-01-20 | End: 2021-01-20 | Stop reason: SDUPTHER

## 2021-01-20 RX ORDER — FENTANYL CITRATE 50 UG/ML
50 INJECTION, SOLUTION INTRAMUSCULAR; INTRAVENOUS EVERY 5 MIN PRN
Status: DISCONTINUED | OUTPATIENT
Start: 2021-01-20 | End: 2021-01-20 | Stop reason: HOSPADM

## 2021-01-20 RX ORDER — MEPERIDINE HYDROCHLORIDE 25 MG/ML
12.5 INJECTION INTRAMUSCULAR; INTRAVENOUS; SUBCUTANEOUS EVERY 5 MIN PRN
Status: DISCONTINUED | OUTPATIENT
Start: 2021-01-20 | End: 2021-01-20 | Stop reason: HOSPADM

## 2021-01-20 RX ORDER — APREPITANT 40 MG/1
40 CAPSULE ORAL ONCE
Status: COMPLETED | OUTPATIENT
Start: 2021-01-20 | End: 2021-01-20

## 2021-01-20 RX ORDER — OXYCODONE HYDROCHLORIDE 5 MG/1
5 TABLET ORAL EVERY 4 HOURS PRN
Status: DISCONTINUED | OUTPATIENT
Start: 2021-01-20 | End: 2021-01-21 | Stop reason: HOSPADM

## 2021-01-20 RX ORDER — SODIUM CHLORIDE 0.9 % (FLUSH) 0.9 %
10 SYRINGE (ML) INJECTION PRN
Status: DISCONTINUED | OUTPATIENT
Start: 2021-01-20 | End: 2021-01-21 | Stop reason: HOSPADM

## 2021-01-20 RX ORDER — DIPHENHYDRAMINE HYDROCHLORIDE 50 MG/ML
12.5 INJECTION INTRAMUSCULAR; INTRAVENOUS
Status: DISCONTINUED | OUTPATIENT
Start: 2021-01-20 | End: 2021-01-20 | Stop reason: HOSPADM

## 2021-01-20 RX ORDER — DEXAMETHASONE SODIUM PHOSPHATE 4 MG/ML
INJECTION, SOLUTION INTRA-ARTICULAR; INTRALESIONAL; INTRAMUSCULAR; INTRAVENOUS; SOFT TISSUE PRN
Status: DISCONTINUED | OUTPATIENT
Start: 2021-01-20 | End: 2021-01-20 | Stop reason: SDUPTHER

## 2021-01-20 RX ORDER — HYDROMORPHONE HCL 110MG/55ML
PATIENT CONTROLLED ANALGESIA SYRINGE INTRAVENOUS PRN
Status: DISCONTINUED | OUTPATIENT
Start: 2021-01-20 | End: 2021-01-20 | Stop reason: SDUPTHER

## 2021-01-20 RX ORDER — OXYCODONE HYDROCHLORIDE 5 MG/1
10 TABLET ORAL EVERY 4 HOURS PRN
Status: DISCONTINUED | OUTPATIENT
Start: 2021-01-20 | End: 2021-01-21 | Stop reason: HOSPADM

## 2021-01-20 RX ORDER — CARBOXYMETHYLCELLULOSE SODIUM 10 MG/ML
GEL OPHTHALMIC PRN
Status: DISCONTINUED | OUTPATIENT
Start: 2021-01-20 | End: 2021-01-20 | Stop reason: SDUPTHER

## 2021-01-20 RX ORDER — SODIUM CHLORIDE 0.9 % (FLUSH) 0.9 %
10 SYRINGE (ML) INJECTION EVERY 12 HOURS SCHEDULED
Status: DISCONTINUED | OUTPATIENT
Start: 2021-01-20 | End: 2021-01-21 | Stop reason: HOSPADM

## 2021-01-20 RX ORDER — KETAMINE HCL IN NACL, ISO-OSM 100MG/10ML
SYRINGE (ML) INJECTION PRN
Status: DISCONTINUED | OUTPATIENT
Start: 2021-01-20 | End: 2021-01-20 | Stop reason: SDUPTHER

## 2021-01-20 RX ORDER — OXYCODONE HYDROCHLORIDE 5 MG/1
5 TABLET ORAL PRN
Status: DISCONTINUED | OUTPATIENT
Start: 2021-01-20 | End: 2021-01-20 | Stop reason: HOSPADM

## 2021-01-20 RX ORDER — HYDROMORPHONE HCL 110MG/55ML
0.25 PATIENT CONTROLLED ANALGESIA SYRINGE INTRAVENOUS EVERY 5 MIN PRN
Status: DISCONTINUED | OUTPATIENT
Start: 2021-01-20 | End: 2021-01-20 | Stop reason: HOSPADM

## 2021-01-20 RX ORDER — KETOROLAC TROMETHAMINE 30 MG/ML
30 INJECTION, SOLUTION INTRAMUSCULAR; INTRAVENOUS EVERY 6 HOURS
Status: DISCONTINUED | OUTPATIENT
Start: 2021-01-20 | End: 2021-01-21 | Stop reason: HOSPADM

## 2021-01-20 RX ORDER — KETOROLAC TROMETHAMINE 30 MG/ML
INJECTION, SOLUTION INTRAMUSCULAR; INTRAVENOUS
Status: COMPLETED
Start: 2021-01-20 | End: 2021-01-20

## 2021-01-20 RX ORDER — PROMETHAZINE HYDROCHLORIDE 25 MG/1
12.5 TABLET ORAL EVERY 6 HOURS PRN
Status: DISCONTINUED | OUTPATIENT
Start: 2021-01-20 | End: 2021-01-21 | Stop reason: HOSPADM

## 2021-01-20 RX ORDER — HYDROMORPHONE HCL 110MG/55ML
0.5 PATIENT CONTROLLED ANALGESIA SYRINGE INTRAVENOUS EVERY 5 MIN PRN
Status: DISCONTINUED | OUTPATIENT
Start: 2021-01-20 | End: 2021-01-20 | Stop reason: HOSPADM

## 2021-01-20 RX ORDER — SUCCINYLCHOLINE/SOD CL,ISO/PF 200MG/10ML
SYRINGE (ML) INTRAVENOUS PRN
Status: DISCONTINUED | OUTPATIENT
Start: 2021-01-20 | End: 2021-01-20 | Stop reason: SDUPTHER

## 2021-01-20 RX ORDER — LORAZEPAM 2 MG/ML
INJECTION INTRAMUSCULAR
Status: COMPLETED
Start: 2021-01-20 | End: 2021-01-20

## 2021-01-20 RX ORDER — MIDAZOLAM HYDROCHLORIDE 1 MG/ML
INJECTION INTRAMUSCULAR; INTRAVENOUS PRN
Status: DISCONTINUED | OUTPATIENT
Start: 2021-01-20 | End: 2021-01-20 | Stop reason: SDUPTHER

## 2021-01-20 RX ORDER — LIDOCAINE HYDROCHLORIDE 10 MG/ML
0.5 INJECTION, SOLUTION EPIDURAL; INFILTRATION; INTRACAUDAL; PERINEURAL ONCE
Status: DISCONTINUED | OUTPATIENT
Start: 2021-01-20 | End: 2021-01-20 | Stop reason: HOSPADM

## 2021-01-20 RX ORDER — MAGNESIUM HYDROXIDE 1200 MG/15ML
LIQUID ORAL CONTINUOUS PRN
Status: COMPLETED | OUTPATIENT
Start: 2021-01-20 | End: 2021-01-20

## 2021-01-20 RX ORDER — ACETAMINOPHEN 500 MG
1000 TABLET ORAL ONCE
Status: COMPLETED | OUTPATIENT
Start: 2021-01-20 | End: 2021-01-20

## 2021-01-20 RX ORDER — MORPHINE SULFATE 2 MG/ML
2 INJECTION, SOLUTION INTRAMUSCULAR; INTRAVENOUS
Status: DISCONTINUED | OUTPATIENT
Start: 2021-01-20 | End: 2021-01-21 | Stop reason: HOSPADM

## 2021-01-20 RX ORDER — ONDANSETRON 2 MG/ML
INJECTION INTRAMUSCULAR; INTRAVENOUS PRN
Status: DISCONTINUED | OUTPATIENT
Start: 2021-01-20 | End: 2021-01-20 | Stop reason: SDUPTHER

## 2021-01-20 RX ORDER — ACETAMINOPHEN 325 MG/1
650 TABLET ORAL EVERY 4 HOURS PRN
Status: DISCONTINUED | OUTPATIENT
Start: 2021-01-20 | End: 2021-01-21 | Stop reason: HOSPADM

## 2021-01-20 RX ORDER — LABETALOL HYDROCHLORIDE 5 MG/ML
5 INJECTION, SOLUTION INTRAVENOUS EVERY 10 MIN PRN
Status: DISCONTINUED | OUTPATIENT
Start: 2021-01-20 | End: 2021-01-20 | Stop reason: HOSPADM

## 2021-01-20 RX ORDER — MORPHINE SULFATE 4 MG/ML
4 INJECTION, SOLUTION INTRAMUSCULAR; INTRAVENOUS
Status: DISCONTINUED | OUTPATIENT
Start: 2021-01-20 | End: 2021-01-21 | Stop reason: HOSPADM

## 2021-01-20 RX ORDER — ONDANSETRON 2 MG/ML
4 INJECTION INTRAMUSCULAR; INTRAVENOUS EVERY 6 HOURS PRN
Status: DISCONTINUED | OUTPATIENT
Start: 2021-01-20 | End: 2021-01-21 | Stop reason: HOSPADM

## 2021-01-20 RX ORDER — DIAZEPAM 5 MG/1
5 TABLET ORAL ONCE
Status: COMPLETED | OUTPATIENT
Start: 2021-01-20 | End: 2021-01-20

## 2021-01-20 RX ORDER — OXYCODONE HYDROCHLORIDE 5 MG/1
10 TABLET ORAL PRN
Status: DISCONTINUED | OUTPATIENT
Start: 2021-01-20 | End: 2021-01-20 | Stop reason: HOSPADM

## 2021-01-20 RX ORDER — PROMETHAZINE HYDROCHLORIDE 25 MG/ML
6.25 INJECTION, SOLUTION INTRAMUSCULAR; INTRAVENOUS PRN
Status: DISCONTINUED | OUTPATIENT
Start: 2021-01-20 | End: 2021-01-20 | Stop reason: HOSPADM

## 2021-01-20 RX ORDER — VASOPRESSIN 20 [USP'U]/ML
INJECTION, SOLUTION INTRAMUSCULAR; SUBCUTANEOUS
Status: COMPLETED | OUTPATIENT
Start: 2021-01-20 | End: 2021-01-20

## 2021-01-20 RX ORDER — FENTANYL CITRATE 50 UG/ML
INJECTION, SOLUTION INTRAMUSCULAR; INTRAVENOUS PRN
Status: DISCONTINUED | OUTPATIENT
Start: 2021-01-20 | End: 2021-01-20 | Stop reason: SDUPTHER

## 2021-01-20 RX ORDER — IBUPROFEN 800 MG/1
800 TABLET ORAL EVERY 6 HOURS PRN
Status: DISCONTINUED | OUTPATIENT
Start: 2021-01-20 | End: 2021-01-21 | Stop reason: HOSPADM

## 2021-01-20 RX ORDER — PHENYLEPHRINE HCL IN 0.9% NACL 1 MG/10 ML
SYRINGE (ML) INTRAVENOUS PRN
Status: DISCONTINUED | OUTPATIENT
Start: 2021-01-20 | End: 2021-01-20 | Stop reason: SDUPTHER

## 2021-01-20 RX ORDER — SCOLOPAMINE TRANSDERMAL SYSTEM 1 MG/1
1 PATCH, EXTENDED RELEASE TRANSDERMAL ONCE
Status: DISCONTINUED | OUTPATIENT
Start: 2021-01-20 | End: 2021-01-21 | Stop reason: HOSPADM

## 2021-01-20 RX ORDER — LIDOCAINE HYDROCHLORIDE 20 MG/ML
INJECTION, SOLUTION EPIDURAL; INFILTRATION; INTRACAUDAL; PERINEURAL PRN
Status: DISCONTINUED | OUTPATIENT
Start: 2021-01-20 | End: 2021-01-20 | Stop reason: SDUPTHER

## 2021-01-20 RX ORDER — ROCURONIUM BROMIDE 10 MG/ML
INJECTION, SOLUTION INTRAVENOUS PRN
Status: DISCONTINUED | OUTPATIENT
Start: 2021-01-20 | End: 2021-01-20 | Stop reason: SDUPTHER

## 2021-01-20 RX ORDER — LORAZEPAM 2 MG/ML
1 INJECTION INTRAMUSCULAR ONCE
Status: COMPLETED | OUTPATIENT
Start: 2021-01-20 | End: 2021-01-20

## 2021-01-20 RX ORDER — SODIUM CHLORIDE, SODIUM LACTATE, POTASSIUM CHLORIDE, CALCIUM CHLORIDE 600; 310; 30; 20 MG/100ML; MG/100ML; MG/100ML; MG/100ML
INJECTION, SOLUTION INTRAVENOUS CONTINUOUS
Status: DISCONTINUED | OUTPATIENT
Start: 2021-01-20 | End: 2021-01-21 | Stop reason: HOSPADM

## 2021-01-20 RX ORDER — SODIUM CHLORIDE, SODIUM LACTATE, POTASSIUM CHLORIDE, CALCIUM CHLORIDE 600; 310; 30; 20 MG/100ML; MG/100ML; MG/100ML; MG/100ML
INJECTION, SOLUTION INTRAVENOUS CONTINUOUS
Status: DISCONTINUED | OUTPATIENT
Start: 2021-01-20 | End: 2021-01-20

## 2021-01-20 RX ADMIN — ONDANSETRON 4 MG: 2 INJECTION INTRAMUSCULAR; INTRAVENOUS at 10:37

## 2021-01-20 RX ADMIN — PROPOFOL 50 MG: 10 INJECTION, EMULSION INTRAVENOUS at 08:38

## 2021-01-20 RX ADMIN — MORPHINE SULFATE 2 MG: 2 INJECTION, SOLUTION INTRAMUSCULAR; INTRAVENOUS at 17:01

## 2021-01-20 RX ADMIN — KETOROLAC TROMETHAMINE 30 MG: 30 INJECTION, SOLUTION INTRAMUSCULAR at 11:49

## 2021-01-20 RX ADMIN — HYDROMORPHONE HYDROCHLORIDE 0.2 MG: 2 INJECTION, SOLUTION INTRAMUSCULAR; INTRAVENOUS; SUBCUTANEOUS at 10:48

## 2021-01-20 RX ADMIN — Medication 10 MG: at 08:52

## 2021-01-20 RX ADMIN — APREPITANT 40 MG: 40 CAPSULE ORAL at 07:34

## 2021-01-20 RX ADMIN — HYDROMORPHONE HYDROCHLORIDE 0.4 MG: 2 INJECTION, SOLUTION INTRAMUSCULAR; INTRAVENOUS; SUBCUTANEOUS at 11:00

## 2021-01-20 RX ADMIN — PROPOFOL 50 MG: 10 INJECTION, EMULSION INTRAVENOUS at 09:51

## 2021-01-20 RX ADMIN — HYDROMORPHONE HYDROCHLORIDE 0.2 MG: 2 INJECTION, SOLUTION INTRAMUSCULAR; INTRAVENOUS; SUBCUTANEOUS at 09:07

## 2021-01-20 RX ADMIN — SUGAMMADEX 100 MG: 100 INJECTION, SOLUTION INTRAVENOUS at 10:39

## 2021-01-20 RX ADMIN — Medication 20 MG: at 08:41

## 2021-01-20 RX ADMIN — PROPOFOL 50 MG: 10 INJECTION, EMULSION INTRAVENOUS at 09:13

## 2021-01-20 RX ADMIN — MIDAZOLAM 2 MG: 1 INJECTION INTRAMUSCULAR; INTRAVENOUS at 08:25

## 2021-01-20 RX ADMIN — HYDROMORPHONE HYDROCHLORIDE 0.2 MG: 2 INJECTION, SOLUTION INTRAMUSCULAR; INTRAVENOUS; SUBCUTANEOUS at 10:52

## 2021-01-20 RX ADMIN — Medication 100 MCG: at 10:19

## 2021-01-20 RX ADMIN — LORAZEPAM 1 MG: 2 INJECTION INTRAMUSCULAR at 11:35

## 2021-01-20 RX ADMIN — ROCURONIUM BROMIDE 10 MG: 10 INJECTION, SOLUTION INTRAVENOUS at 08:44

## 2021-01-20 RX ADMIN — SODIUM CHLORIDE, POTASSIUM CHLORIDE, SODIUM LACTATE AND CALCIUM CHLORIDE: 600; 310; 30; 20 INJECTION, SOLUTION INTRAVENOUS at 07:40

## 2021-01-20 RX ADMIN — OXYCODONE 10 MG: 5 TABLET ORAL at 20:27

## 2021-01-20 RX ADMIN — Medication 100 MG: at 08:33

## 2021-01-20 RX ADMIN — PROPOFOL 150 MG: 10 INJECTION, EMULSION INTRAVENOUS at 08:33

## 2021-01-20 RX ADMIN — LORAZEPAM 1 MG: 2 INJECTION INTRAMUSCULAR; INTRAVENOUS at 11:35

## 2021-01-20 RX ADMIN — MAGNESIUM SULFATE HEPTAHYDRATE 1 G: 500 INJECTION, SOLUTION INTRAMUSCULAR; INTRAVENOUS at 08:38

## 2021-01-20 RX ADMIN — DIAZEPAM 5 MG: 5 TABLET ORAL at 21:17

## 2021-01-20 RX ADMIN — HYDROMORPHONE HYDROCHLORIDE 0.5 MG: 2 INJECTION, SOLUTION INTRAMUSCULAR; INTRAVENOUS; SUBCUTANEOUS at 11:35

## 2021-01-20 RX ADMIN — SODIUM CHLORIDE, POTASSIUM CHLORIDE, SODIUM LACTATE AND CALCIUM CHLORIDE: 600; 310; 30; 20 INJECTION, SOLUTION INTRAVENOUS at 09:00

## 2021-01-20 RX ADMIN — KETOROLAC TROMETHAMINE 30 MG: 30 INJECTION, SOLUTION INTRAMUSCULAR at 18:41

## 2021-01-20 RX ADMIN — HYDROMORPHONE HYDROCHLORIDE 0.2 MG: 2 INJECTION, SOLUTION INTRAMUSCULAR; INTRAVENOUS; SUBCUTANEOUS at 09:00

## 2021-01-20 RX ADMIN — FENTANYL CITRATE 50 MCG: 50 INJECTION INTRAMUSCULAR; INTRAVENOUS at 08:33

## 2021-01-20 RX ADMIN — SUGAMMADEX 50 MG: 100 INJECTION, SOLUTION INTRAVENOUS at 10:46

## 2021-01-20 RX ADMIN — CARBOXYMETHYLCELLULOSE SODIUM 1 DROP: 10 GEL OPHTHALMIC at 08:47

## 2021-01-20 RX ADMIN — CEFAZOLIN SODIUM 2000 MG: 10 INJECTION, POWDER, FOR SOLUTION INTRAVENOUS at 08:20

## 2021-01-20 RX ADMIN — HYDROMORPHONE HYDROCHLORIDE 0.4 MG: 2 INJECTION, SOLUTION INTRAMUSCULAR; INTRAVENOUS; SUBCUTANEOUS at 10:55

## 2021-01-20 RX ADMIN — FENTANYL CITRATE 50 MCG: 50 INJECTION INTRAMUSCULAR; INTRAVENOUS at 08:38

## 2021-01-20 RX ADMIN — MIDAZOLAM 2 MG: 1 INJECTION INTRAMUSCULAR; INTRAVENOUS at 08:28

## 2021-01-20 RX ADMIN — ROCURONIUM BROMIDE 10 MG: 10 INJECTION, SOLUTION INTRAVENOUS at 09:40

## 2021-01-20 RX ADMIN — HYDROMORPHONE HYDROCHLORIDE 0.5 MG: 2 INJECTION, SOLUTION INTRAMUSCULAR; INTRAVENOUS; SUBCUTANEOUS at 13:00

## 2021-01-20 RX ADMIN — ACETAMINOPHEN 1000 MG: 500 TABLET ORAL at 07:34

## 2021-01-20 RX ADMIN — ROCURONIUM BROMIDE 25 MG: 10 INJECTION, SOLUTION INTRAVENOUS at 08:38

## 2021-01-20 RX ADMIN — MORPHINE SULFATE 4 MG: 4 INJECTION INTRAVENOUS at 22:31

## 2021-01-20 RX ADMIN — LIDOCAINE HYDROCHLORIDE 60 MG: 20 INJECTION, SOLUTION EPIDURAL; INFILTRATION; INTRACAUDAL; PERINEURAL at 08:33

## 2021-01-20 RX ADMIN — ROCURONIUM BROMIDE 5 MG: 10 INJECTION, SOLUTION INTRAVENOUS at 08:33

## 2021-01-20 RX ADMIN — HYDROMORPHONE HYDROCHLORIDE 0.4 MG: 2 INJECTION, SOLUTION INTRAMUSCULAR; INTRAVENOUS; SUBCUTANEOUS at 09:49

## 2021-01-20 RX ADMIN — DEXAMETHASONE SODIUM PHOSPHATE 4 MG: 4 INJECTION, SOLUTION INTRAMUSCULAR; INTRAVENOUS at 08:38

## 2021-01-20 RX ADMIN — SODIUM CHLORIDE, POTASSIUM CHLORIDE, SODIUM LACTATE AND CALCIUM CHLORIDE: 600; 310; 30; 20 INJECTION, SOLUTION INTRAVENOUS at 22:30

## 2021-01-20 ASSESSMENT — PULMONARY FUNCTION TESTS
PIF_VALUE: 2
PIF_VALUE: 15
PIF_VALUE: 18
PIF_VALUE: 3
PIF_VALUE: 17
PIF_VALUE: 16
PIF_VALUE: 17
PIF_VALUE: 18
PIF_VALUE: 19
PIF_VALUE: 19
PIF_VALUE: 18
PIF_VALUE: 17
PIF_VALUE: 18
PIF_VALUE: 0
PIF_VALUE: 19
PIF_VALUE: 16
PIF_VALUE: 16
PIF_VALUE: 18
PIF_VALUE: 17
PIF_VALUE: 12
PIF_VALUE: 17
PIF_VALUE: 17
PIF_VALUE: 1
PIF_VALUE: 18
PIF_VALUE: 19
PIF_VALUE: 17
PIF_VALUE: 18
PIF_VALUE: 17
PIF_VALUE: 1
PIF_VALUE: 0
PIF_VALUE: 18
PIF_VALUE: 19
PIF_VALUE: 15
PIF_VALUE: 0
PIF_VALUE: 17
PIF_VALUE: 12
PIF_VALUE: 18
PIF_VALUE: 1
PIF_VALUE: 19
PIF_VALUE: 16
PIF_VALUE: 19
PIF_VALUE: 17
PIF_VALUE: 1
PIF_VALUE: 17
PIF_VALUE: 18
PIF_VALUE: 17
PIF_VALUE: 18
PIF_VALUE: 18
PIF_VALUE: 17
PIF_VALUE: 18
PIF_VALUE: 17
PIF_VALUE: 18
PIF_VALUE: 16
PIF_VALUE: 18
PIF_VALUE: 17
PIF_VALUE: 18
PIF_VALUE: 19
PIF_VALUE: 1
PIF_VALUE: 18
PIF_VALUE: 1
PIF_VALUE: 2
PIF_VALUE: 17
PIF_VALUE: 20
PIF_VALUE: 20
PIF_VALUE: 18
PIF_VALUE: 2
PIF_VALUE: 22
PIF_VALUE: 16
PIF_VALUE: 18
PIF_VALUE: 3
PIF_VALUE: 18
PIF_VALUE: 18
PIF_VALUE: 17
PIF_VALUE: 18
PIF_VALUE: 19
PIF_VALUE: 16
PIF_VALUE: 12
PIF_VALUE: 18
PIF_VALUE: 19
PIF_VALUE: 19
PIF_VALUE: 3

## 2021-01-20 ASSESSMENT — PAIN SCALES - GENERAL
PAINLEVEL_OUTOF10: 8
PAINLEVEL_OUTOF10: 0

## 2021-01-20 NOTE — H&P
The H&P was reviewed, the patient was examined, and no change has occurred in the patient's condition since the H&P was completed. Risks and benefits reviewed with patient and patient consents to procedure.     [address]

## 2021-01-20 NOTE — BRIEF OP NOTE
Department of Gynecology  Brief Operative Report        Pre-operative Diagnosis:  1. Fibroids      2. Abnormal uterine bleeding      3. Dysmenorrhea    Post-operative Diagnosis:  Same      4. Abdominal adhesions    Procedure: 1. Vaginal removal of cervix    2. Conversion to laparotomy for hysterectomy    3. Lysis of adhesions    Surgeon:  Mundo Hennessy     Assistant(s):      Anesthesia:  General Endotracheal Anesthesia    Findings:  Normal ovaries and tubes. Dense adhesions from the uterus to the anterior abdominal wall requiring laparotomy and extensive lysis of adhesions. Total IV fluids:   ml    Urine Output:   ml    Estimated blood loss:  150ml    Blood Transfusion?:  No     Drains:  none    Specimens:  Uterus and cervix    Complications:  none    Condition:  stable    See dictated operative report for full details.

## 2021-01-20 NOTE — ANESTHESIA POSTPROCEDURE EVALUATION
Department of Anesthesiology  Postprocedure Note    Patient: Buck Rosa  MRN: [id number]  [place]: 1980  Date of evaluation: 1/20/2021  Time:  11:30 AM     Procedure Summary     Date: 01/20/21 Room / Location: [address]    Anesthesia Start: 0825 Anesthesia Stop: 9121    Procedures:       ATTEMPTED TOTAL VAGINAL HYSTERECTOMY (N/A )      HYSTERECTOMY ABDOMINAL Diagnosis:       (D25.9  FIBROIDS)      (N94.6  DYSMENORRHEA)      (N93.9  ABNORMAL UTERINE BLEEDING)    Surgeons: Rossi Quick MD Responsible Provider: Hawk Ernandez MD    Anesthesia Type: general ASA Status: 2          Anesthesia Type: general    Talita Phase I: Talita Score: 7    Talita Phase II:      Last vitals: Reviewed and per EMR flowsheets.        Anesthesia Post Evaluation    Level of consciousness: awake and anxious  Complications: no

## 2021-01-20 NOTE — PROGRESS NOTES
Pt awakens easily to voice, pain is controlled and denies nausea. Ford catheter intact and draining clear yellow urine. Abdomen soft, dressing CDI to lower abdomen. Vital stable. Plan to transfer to post partum room, per orders.

## 2021-01-20 NOTE — PROGRESS NOTES
Pt woke up, crying out, agitated, very restless on the cart, Pain meds given. Pt continued to cry out and very restless. Call placed to Dr Michael Gentile, anesthesia, order received for Ativan. Given per [facility]

## 2021-01-20 NOTE — PROGRESS NOTES
Pt arrived to pacu, sedated, responds to pain. O2 sats >92% on 5L via simple mask. Abdomen soft, non distended, dressing to lower abdomen CDI.   Continue to monitor

## 2021-01-20 NOTE — OP NOTE
Department of Gynecology  Operative Report        Pre-operative Diagnosis: 1. Fibroids      2. Dysmenorrhea      3. Abnormal Uterine bleeding    Post-operative Diagnosis:  Same      4. Adhesive disease    Procedure:  1. Vaginal Trachelectomy    2. Laparotomy    3. Abdominal hysterectomy    Surgeon:  Adan Boswell MD     Assistant(s):      Anesthesia:  General Endotracheal Anesthesia    Findings:  Normal tubes and ovaries, Dense adhesions from the anterior uterine fundus to the anterior abdominal wall    Total IV fluids:   ml    Urine Output:  Clear at the end of the procedure    Estimated blood loss:  150ml    Blood Transfusion?:  No     Drains:  none    Specimens:  Uterus and cervix    Complications:  none    Condition:  stable    Operative Course:  Risks of procedure were discussed with the patient and all questions answered. Written informed consent was obtained and the patient was taken to the operating room. She was placed on the operating table, once adequate anesthetic level was documented she was prepped and draped in the high lithotomy position. A pause was then take to perform a time-out. Jay retractors were placed both anteriorly and posteriorly, then the cervix was grasped with 2 single tooth tenaculums. A solution of 10 mg of Pitressin in 250 ml of saline was then injected at the cerivco-vaginal junction. The cervix was then incised circumferentially with the cautery. At this time posterior colpotomy was performed and the posterior peritoneum pulled up and re-approximated with a single 0-vicryl suture in the midline to the vaginal cuff. A Neena retractor was then placed posteriorly. The anterior vaginal wall and bladder were carefully dissected away from the cervix and uterosacral ligaments ligated bilaterally with 0-vicryl suture. The bladder was dissected away from the anterior surface of the cervix however an anterior colpotomy could not be safely made.  The cervix was then amputated and further dissection continued anteriorly. The plane between the bladder and the uterus became indistinct and dense fibrous tissue was then encountered. There was no further decent of the uterus and visualization became limited. Decision was made to convert to laparotomy at that time. An abdominal tray was opened and a phannensteil skin incision was made along her previous  scar. The fascia was then incised and extended laterally. There was dense scarring that required extensive dissection to free the rectus layer. The rectus layer was then  and the peritoneal cavity entered. The anterior surface of the uterus was noted to be densely adherent to the anterior abdominal wall. The left ovarian pedicle was then identified and ligated with first a free tie and then a suture of 0-vicryl suture. The right ovarian pedicle was then identified and ligated in the same fashion. Extensive sharp dissection was then performed to free the uterus from the abdominal wall with care taken not to injure the bladder. Once removed the vaginal opening was identified and the corners were closed with 0-vicryl sutures. The cuff was then closed with figure of 8 sutures of o-vicryl. An overlapping suture was needed on the right side to assure hemostasis. Floseal was placed over the anterior cul-de-sac where the adhesions were taken down. The rectus layer was closed with o-vicryl sutures. The fascia was closed with o-vicryl suture. The sub-q was reapproximated with 3-0 vicryl as well as the subdermal layer. The skin was closed with 4-0 monocryl. All instruments were removed, all counts were correct, the patient tolerated the procedure well and was taken to the recovery room in stable condition.

## 2021-01-20 NOTE — ANESTHESIA PRE PROCEDURE
Department of Anesthesiology  Preprocedure Note       Name:  Eduardo Huitron   Age:  39 y.o.  :  1980                                          MRN:  [id number]         Date:  2021      Surgeon: Cesario Singh):  Charis Hopper MD    Procedure: Procedure(s):  TOTAL VAGINAL HYSTERECTOMY    Medications prior to admission:   Prior to Admission medications    Medication Sig Start Date End Date Taking? Authorizing Provider   ondansetron (ZOFRAN) 4 MG tablet Take 1 tablet by mouth every 8 hours as needed for Nausea  Patient not taking: Reported on 2021   Barry Ayala MD       Current medications:    Current Facility-Administered Medications   Medication Dose Route Frequency Provider Last Rate Last Admin    meperidine (DEMEROL) injection 12.5 mg  12.5 mg Intravenous Q5 Min PRN Iglesia Cook MD        HYDROmorphone (DILAUDID) injection 0.25 mg  0.25 mg Intravenous Q5 Min PRN Iglesiagenesis Cook MD        fentaNYL (SUBLIMAZE) injection 50 mcg  50 mcg Intravenous Q5 Min PRN Iglesiagenesis Cook MD        HYDROmorphone (DILAUDID) injection 0.25 mg  0.25 mg Intravenous Q5 Min PRN Iglesiagenesis Cook MD        HYDROmorphone (DILAUDID) injection 0.5 mg  0.5 mg Intravenous Q5 Min PRN Iglesiagenesis Cook MD        oxyCODONE (ROXICODONE) immediate release tablet 5 mg  5 mg Oral PRN Iglesia Cook MD        Or    oxyCODONE (ROXICODONE) immediate release tablet 10 mg  10 mg Oral PRN Iglesiagenesis Cook MD        promethazine (PHENERGAN) injection 6.25 mg  6.25 mg Intravenous PRN Iglesiagenesis Cook MD        diphenhydrAMINE (BENADRYL) injection 12.5 mg  12.5 mg Intravenous Once PRN Iglesiagenesis Cook MD        labetalol (NORMODYNE;TRANDATE) injection 5 mg  5 mg Intravenous Q10 Min PRN Iglesiagenesis Cook MD           Allergies:  No Known Allergies    Problem List:  There is no problem list on file for this patient.       Past Medical History:        Diagnosis Date    Anemia     Asthma        Past Surgical History: Procedure Laterality Date     SECTION      x3    CYST REMOVAL      x2 from lower back    FOOT SURGERY      bilateral    TONSILLECTOMY      TUBAL LIGATION         Social History:    Social History     Tobacco Use    Smoking status: Current Every Day Smoker     Packs/day: 0.50     Years: 15.00     Pack years: 7.50     Types: Cigarettes    Smokeless tobacco: Never Used   Substance Use Topics    Alcohol use: Yes     Comment: 4-5 DRINKS PER WEEK                                Ready to quit: No  Counseling given: Yes      Vital Signs (Current):   Vitals:    21 0945   Weight: 125 lb (56.7 kg)   Height: 5' 2\" (1.575 m)                                              BP Readings from Last 3 Encounters:   20 (!) 118/58   19 120/78   12 112/64       NPO Status:                                                                                 BMI:   Wt Readings from Last 3 Encounters:   21 125 lb (56.7 kg)   20 130 lb (59 kg)   19 127 lb (57.6 kg)     Body mass index is 22.86 kg/m². CBC:   Lab Results   Component Value Date    WBC 5.0 2020    RBC 3.77 2020    HGB 12.2 2020    HCT 35.4 2020    MCV 94.0 2020    RDW 21.6 2020     2020       CMP:   Lab Results   Component Value Date     2020    K 3.5 2020     2020    CO2 24 2020    BUN 11 2020    CREATININE 0.7 2020    GFRAA >60 2020    GFRAA >60 2012    AGRATIO 1.4 2020    LABGLOM >60 2020    GLUCOSE 93 2020    PROT 7.0 2020    CALCIUM 9.5 2020    BILITOT 0.6 2020    ALKPHOS 68 2020     2020    ALT 69 2020       POC Tests: No results for input(s): POCGLU, POCNA, POCK, POCCL, POCBUN, POCHEMO, POCHCT in the last 72 hours.     Coags: No results found for: PROTIME, INR, APTT    HCG (If Applicable):   Lab Results   Component Value Date PREGTESTUR Negative 07/19/2019        ABGs: No results found for: PHART, PO2ART, ROE0FHY, KIR5ZAF, BEART, L0PQAORS     Type & Screen (If Applicable):  No results found for: LABABO, LABRH    Drug/Infectious Status (If Applicable):  No results found for: HIV, HEPCAB    COVID-19 Screening (If Applicable):   Lab Results   Component Value Date    COVID19 Not Detected 01/16/2021         Anesthesia Evaluation    Airway: Mallampati: II  TM distance: >3 FB   Neck ROM: full  Mouth opening: > = 3 FB Dental:          Pulmonary:   (+) asthma:                            Cardiovascular:            Rhythm: regular  Rate: normal                    Neuro/Psych:               GI/Hepatic/Renal:             Endo/Other:                     Abdominal:           Vascular:                                        Anesthesia Plan      general     ASA 2       Induction: intravenous. Anesthetic plan and risks discussed with patient. Plan discussed with CRNA.                   Gregg Nunez MD   1/20/2021

## 2021-01-21 VITALS
SYSTOLIC BLOOD PRESSURE: 105 MMHG | DIASTOLIC BLOOD PRESSURE: 57 MMHG | HEART RATE: 63 BPM | HEIGHT: 62 IN | BODY MASS INDEX: 23.19 KG/M2 | RESPIRATION RATE: 17 BRPM | WEIGHT: 126 LBS | OXYGEN SATURATION: 99 % | TEMPERATURE: 98 F

## 2021-01-21 LAB
BASOPHILS ABSOLUTE: 0 K/UL (ref 0–0.2)
BASOPHILS RELATIVE PERCENT: 0.2 %
EOSINOPHILS ABSOLUTE: 0 K/UL (ref 0–0.6)
EOSINOPHILS RELATIVE PERCENT: 0.2 %
HCT VFR BLD CALC: 26.4 % (ref 36–48)
HEMOGLOBIN: 8.5 G/DL (ref 12–16)
LYMPHOCYTES ABSOLUTE: 1.8 K/UL (ref 1–5.1)
LYMPHOCYTES RELATIVE PERCENT: 21.2 %
MCH RBC QN AUTO: 31 PG (ref 26–34)
MCHC RBC AUTO-ENTMCNC: 32.3 G/DL (ref 31–36)
MCV RBC AUTO: 96 FL (ref 80–100)
MONOCYTES ABSOLUTE: 0.6 K/UL (ref 0–1.3)
MONOCYTES RELATIVE PERCENT: 7.2 %
NEUTROPHILS ABSOLUTE: 6.1 K/UL (ref 1.7–7.7)
NEUTROPHILS RELATIVE PERCENT: 71.2 %
PDW BLD-RTO: 20.2 % (ref 12.4–15.4)
PLATELET # BLD: 179 K/UL (ref 135–450)
PMV BLD AUTO: 8.3 FL (ref 5–10.5)
RBC # BLD: 2.75 M/UL (ref 4–5.2)
WBC # BLD: 8.6 K/UL (ref 4–11)

## 2021-01-21 PROCEDURE — 6370000000 HC RX 637 (ALT 250 FOR IP): Performed by: OBSTETRICS & GYNECOLOGY

## 2021-01-21 PROCEDURE — 85025 COMPLETE CBC W/AUTO DIFF WBC: CPT

## 2021-01-21 PROCEDURE — 6360000002 HC RX W HCPCS: Performed by: OBSTETRICS & GYNECOLOGY

## 2021-01-21 PROCEDURE — 2580000003 HC RX 258: Performed by: OBSTETRICS & GYNECOLOGY

## 2021-01-21 RX ORDER — OXYCODONE HYDROCHLORIDE 5 MG/1
5 TABLET ORAL EVERY 6 HOURS PRN
Qty: 28 TABLET | Refills: 0 | Status: SHIPPED | OUTPATIENT
Start: 2021-01-21 | End: 2021-01-28

## 2021-01-21 RX ORDER — IBUPROFEN 600 MG/1
600 TABLET ORAL EVERY 6 HOURS PRN
Qty: 120 TABLET | Refills: 0 | Status: SHIPPED | OUTPATIENT
Start: 2021-01-21

## 2021-01-21 RX ORDER — ACETAMINOPHEN 500 MG
500 TABLET ORAL EVERY 6 HOURS PRN
Qty: 120 TABLET | Refills: 0 | Status: SHIPPED | OUTPATIENT
Start: 2021-01-21

## 2021-01-21 RX ADMIN — SODIUM CHLORIDE, POTASSIUM CHLORIDE, SODIUM LACTATE AND CALCIUM CHLORIDE: 600; 310; 30; 20 INJECTION, SOLUTION INTRAVENOUS at 08:28

## 2021-01-21 RX ADMIN — KETOROLAC TROMETHAMINE 30 MG: 30 INJECTION, SOLUTION INTRAMUSCULAR at 06:22

## 2021-01-21 RX ADMIN — Medication 10 ML: at 12:23

## 2021-01-21 RX ADMIN — OXYCODONE 10 MG: 5 TABLET ORAL at 08:27

## 2021-01-21 RX ADMIN — KETOROLAC TROMETHAMINE 30 MG: 30 INJECTION, SOLUTION INTRAMUSCULAR at 00:17

## 2021-01-21 RX ADMIN — KETOROLAC TROMETHAMINE 30 MG: 30 INJECTION, SOLUTION INTRAMUSCULAR at 12:22

## 2021-01-21 RX ADMIN — OXYCODONE 10 MG: 5 TABLET ORAL at 04:19

## 2021-01-21 ASSESSMENT — PAIN SCALES - GENERAL
PAINLEVEL_OUTOF10: 7
PAINLEVEL_OUTOF10: 7
PAINLEVEL_OUTOF10: 8

## 2021-01-21 NOTE — PROGRESS NOTES
CLINICAL PHARMACY NOTE: MEDS TO [address] [place] [address] Drive Select Patient?: No  Total # of Prescriptions Filled: 3   The following medications were delivered to the patient:  · Oxycodone   · Ibuprofen  · Acetaminophen  Total # of Interventions Completed: 0  Time Spent (min): 60    Additional Documentation:  Delivered to patient    Adarsh Lance

## 2021-01-21 NOTE — FLOWSHEET NOTE
Discharge teaching completed and forms signed by patient. Copy witnessed by RN and given to patient. Patient verbalized understanding of all teaching points. Prescriptions filled by outpatient pharmacy. Patient plans to follow-up with Dr. Anna Hill as instructed in 1-2 weeks. Patient verbalizes understanding of discharge instructions and denies further questions. Patient discharged in stable condition accompanied by family/guardian. Discharged in wheelchair.

## 2021-01-21 NOTE — FLOWSHEET NOTE
Discharge Rx for motrin, tylenol, and oxycodone given to pt with instructions on use and potential side effects. Pt verbalized understanding.

## 2021-01-21 NOTE — PROGRESS NOTES
S: pain controlled by meds, + ambulation, kari po, + flatus, voiding without difficulty, minimal vaginal bleeding    O: BP (!) 99/47   Pulse 84   Temp 98 °F (36.7 °C) (Oral)   Resp 18   Ht 5' 2\" (1.575 m)   Wt 126 lb (57.2 kg)   LMP 01/14/2021   SpO2 98%   Breastfeeding No   BMI 23.05 kg/m²   CVS - RRR  Lungs - CTAB  Abd- soft, appropriately tender, inc c/d/i    WBC/Hgb/Hct/Plts:  8.6/8.5/26.4/179 (01/21 0640)    A/P: 38 yo POD #1 vaginal trachelectomy, laparotomy, lysis of adhesions, abdominal hysterectomy  1. Doing well  2. All questions answered  3. Anemia  4.  Anticipate d/c home today to follow-up in 2 weeks

## 2021-01-21 NOTE — DISCHARGE SUMMARY
Physician Discharge Summary     Patient ID:  Bonnie Gonsalez  [id number]  39 y.o.  1980    Admit date: 1/20/2021    Discharge date:      Admitting Physician: Parth Jaeger    Discharge Diagnoses: S/P laparotomy [D34.351]    Discharged Condition: STABLE    Procedures Performed: vaginal trachelectomy, laparotomy, Abdominal hysterectomy    Hospital Course: Patient was admitted on the day of surgery, and underwent an uncomplicated procedure. See dictated op note. Discharge Exam:  Appears well, AVSS, abdomen soft, appropriately tender, nondistended, BS present, incisions clean dry, intact    Disposition: Good    Patient Instructions: Activity: ambulate in house, no lifting or strenuous exercise for 6 weeks, no sex for 6-8 weeks and no driving while on analgesics    Diet: Regular    Wound Care: Keep wound clean and dry    Discharge Medication:    Bhupinder Carter   Home Medication Instructions [id number]    Printed on:01/21/21 1012   Medication Information                      acetaminophen (TYLENOL) 500 MG tablet  Take 1 tablet by mouth every 6 hours as needed for Pain             ibuprofen (ADVIL;MOTRIN) 600 MG tablet  Take 1 tablet by mouth every 6 hours as needed for Pain             oxyCODONE (ROXICODONE) 5 MG immediate release tablet  Take 1 tablet by mouth every 6 hours as needed for Pain for up to 7 days. Intended supply: 5 days.  Take lowest dose possible to manage pain                  Follow-up with MARIA ELENA BASS in 1-2 weeks    Signed:  Angella BASS  1/21/2021  10:12 AM

## 2021-01-22 ENCOUNTER — APPOINTMENT (OUTPATIENT)
Dept: CT IMAGING | Age: 41
End: 2021-01-22
Payer: COMMERCIAL

## 2021-01-22 ENCOUNTER — HOSPITAL ENCOUNTER (EMERGENCY)
Age: 41
Discharge: HOME | End: 2021-01-22
Attending: EMERGENCY MEDICINE
Payer: COMMERCIAL

## 2021-01-22 VITALS
RESPIRATION RATE: 16 BRPM | TEMPERATURE: 97.5 F | HEART RATE: 79 BPM | SYSTOLIC BLOOD PRESSURE: 101 MMHG | OXYGEN SATURATION: 100 % | DIASTOLIC BLOOD PRESSURE: 57 MMHG

## 2021-01-22 DIAGNOSIS — G89.18 POST-OPERATIVE PAIN: Primary | ICD-10-CM

## 2021-01-22 DIAGNOSIS — Z90.710 S/P ABDOMINAL HYSTERECTOMY: ICD-10-CM

## 2021-01-22 LAB
ALBUMIN SERPL-MCNC: 3.5 G/DL (ref 3.4–5)
ALP BLD-CCNC: 53 U/L (ref 40–129)
ALT SERPL-CCNC: 38 U/L (ref 10–40)
ANION GAP SERPL CALCULATED.3IONS-SCNC: 10 MMOL/L (ref 3–16)
AST SERPL-CCNC: 45 U/L (ref 15–37)
BASOPHILS ABSOLUTE: 0 K/UL (ref 0–0.2)
BASOPHILS RELATIVE PERCENT: 0.4 %
BILIRUB SERPL-MCNC: 0.5 MG/DL (ref 0–1)
BILIRUBIN DIRECT: <0.2 MG/DL (ref 0–0.3)
BILIRUBIN URINE: NEGATIVE
BILIRUBIN, INDIRECT: ABNORMAL MG/DL (ref 0–1)
BLOOD, URINE: ABNORMAL
BUN BLDV-MCNC: 5 MG/DL (ref 7–20)
CALCIUM SERPL-MCNC: 8.7 MG/DL (ref 8.3–10.6)
CHLORIDE BLD-SCNC: 104 MMOL/L (ref 99–110)
CLARITY: ABNORMAL
CO2: 24 MMOL/L (ref 21–32)
COLOR: YELLOW
CREAT SERPL-MCNC: 0.5 MG/DL (ref 0.6–1.1)
EOSINOPHILS ABSOLUTE: 0.1 K/UL (ref 0–0.6)
EOSINOPHILS RELATIVE PERCENT: 1.5 %
EPITHELIAL CELLS, UA: 6 /HPF (ref 0–5)
GFR AFRICAN AMERICAN: >60
GFR NON-AFRICAN AMERICAN: >60
GLUCOSE BLD-MCNC: 90 MG/DL (ref 70–99)
GLUCOSE URINE: NEGATIVE MG/DL
HCT VFR BLD CALC: 27.6 % (ref 36–48)
HEMOGLOBIN: 8.9 G/DL (ref 12–16)
HYALINE CASTS: 5 /LPF (ref 0–8)
KETONES, URINE: >=80 MG/DL
LEUKOCYTE ESTERASE, URINE: ABNORMAL
LIPASE: 8 U/L (ref 13–60)
LYMPHOCYTES ABSOLUTE: 1.6 K/UL (ref 1–5.1)
LYMPHOCYTES RELATIVE PERCENT: 21.5 %
MCH RBC QN AUTO: 31.3 PG (ref 26–34)
MCHC RBC AUTO-ENTMCNC: 32.1 G/DL (ref 31–36)
MCV RBC AUTO: 97.5 FL (ref 80–100)
MICROSCOPIC EXAMINATION: YES
MONOCYTES ABSOLUTE: 0.7 K/UL (ref 0–1.3)
MONOCYTES RELATIVE PERCENT: 9.6 %
NEUTROPHILS ABSOLUTE: 5 K/UL (ref 1.7–7.7)
NEUTROPHILS RELATIVE PERCENT: 67 %
NITRITE, URINE: NEGATIVE
PDW BLD-RTO: 20.7 % (ref 12.4–15.4)
PH UA: 6.5 (ref 5–8)
PLATELET # BLD: 222 K/UL (ref 135–450)
PMV BLD AUTO: 8.3 FL (ref 5–10.5)
POTASSIUM SERPL-SCNC: 3.5 MMOL/L (ref 3.5–5.1)
PROTEIN UA: NEGATIVE MG/DL
RBC # BLD: 2.83 M/UL (ref 4–5.2)
RBC UA: 4 /HPF (ref 0–4)
SODIUM BLD-SCNC: 138 MMOL/L (ref 136–145)
SPECIFIC GRAVITY UA: 1.02 (ref 1–1.03)
TOTAL PROTEIN: 6 G/DL (ref 6.4–8.2)
URINE REFLEX TO CULTURE: ABNORMAL
URINE TYPE: ABNORMAL
UROBILINOGEN, URINE: 1 E.U./DL
WBC # BLD: 7.5 K/UL (ref 4–11)
WBC UA: 9 /HPF (ref 0–5)

## 2021-01-22 PROCEDURE — 2580000003 HC RX 258: Performed by: PHYSICIAN ASSISTANT

## 2021-01-22 PROCEDURE — 96375 TX/PRO/DX INJ NEW DRUG ADDON: CPT

## 2021-01-22 PROCEDURE — 74177 CT ABD & PELVIS W/CONTRAST: CPT

## 2021-01-22 PROCEDURE — 81001 URINALYSIS AUTO W/SCOPE: CPT

## 2021-01-22 PROCEDURE — 80076 HEPATIC FUNCTION PANEL: CPT

## 2021-01-22 PROCEDURE — 6360000004 HC RX CONTRAST MEDICATION: Performed by: PHYSICIAN ASSISTANT

## 2021-01-22 PROCEDURE — 6360000002 HC RX W HCPCS: Performed by: PHYSICIAN ASSISTANT

## 2021-01-22 PROCEDURE — 83690 ASSAY OF LIPASE: CPT

## 2021-01-22 PROCEDURE — 85025 COMPLETE CBC W/AUTO DIFF WBC: CPT

## 2021-01-22 PROCEDURE — 99283 EMERGENCY DEPT VISIT LOW MDM: CPT

## 2021-01-22 PROCEDURE — 80048 BASIC METABOLIC PNL TOTAL CA: CPT

## 2021-01-22 PROCEDURE — 96374 THER/PROPH/DIAG INJ IV PUSH: CPT

## 2021-01-22 RX ORDER — ONDANSETRON 2 MG/ML
4 INJECTION INTRAMUSCULAR; INTRAVENOUS ONCE
Status: COMPLETED | OUTPATIENT
Start: 2021-01-22 | End: 2021-01-22

## 2021-01-22 RX ORDER — HYDROMORPHONE HYDROCHLORIDE 1 MG/ML
0.5 INJECTION, SOLUTION INTRAMUSCULAR; INTRAVENOUS; SUBCUTANEOUS ONCE
Status: COMPLETED | OUTPATIENT
Start: 2021-01-22 | End: 2021-01-22

## 2021-01-22 RX ORDER — CIPROFLOXACIN 500 MG/1
500 TABLET, FILM COATED ORAL 2 TIMES DAILY
Qty: 6 TABLET | Refills: 0 | Status: SHIPPED | OUTPATIENT
Start: 2021-01-22 | End: 2021-01-25

## 2021-01-22 RX ORDER — FLUCONAZOLE 150 MG/1
150 TABLET ORAL ONCE
Qty: 1 TABLET | Refills: 0 | Status: SHIPPED | OUTPATIENT
Start: 2021-01-22 | End: 2021-01-22

## 2021-01-22 RX ORDER — 0.9 % SODIUM CHLORIDE 0.9 %
1000 INTRAVENOUS SOLUTION INTRAVENOUS ONCE
Status: COMPLETED | OUTPATIENT
Start: 2021-01-22 | End: 2021-01-22

## 2021-01-22 RX ADMIN — IOPAMIDOL 75 ML: 755 INJECTION, SOLUTION INTRAVENOUS at 18:38

## 2021-01-22 RX ADMIN — SODIUM CHLORIDE 1000 ML: 9 INJECTION, SOLUTION INTRAVENOUS at 19:16

## 2021-01-22 RX ADMIN — ONDANSETRON 4 MG: 2 INJECTION INTRAMUSCULAR; INTRAVENOUS at 19:16

## 2021-01-22 RX ADMIN — HYDROMORPHONE HYDROCHLORIDE 0.5 MG: 1 INJECTION, SOLUTION INTRAMUSCULAR; INTRAVENOUS; SUBCUTANEOUS at 19:17

## 2021-01-22 ASSESSMENT — PAIN DESCRIPTION - ORIENTATION: ORIENTATION: RIGHT

## 2021-01-22 ASSESSMENT — ENCOUNTER SYMPTOMS
DIARRHEA: 0
ABDOMINAL PAIN: 1
SHORTNESS OF BREATH: 0
RESPIRATORY NEGATIVE: 1
COLOR CHANGE: 0
NAUSEA: 0
BACK PAIN: 0
COUGH: 0
CONSTIPATION: 1
VOMITING: 0
CHEST TIGHTNESS: 0

## 2021-01-22 ASSESSMENT — PAIN SCALES - GENERAL: PAINLEVEL_OUTOF10: 10

## 2021-01-22 ASSESSMENT — PAIN DESCRIPTION - PAIN TYPE: TYPE: ACUTE PAIN

## 2021-01-22 NOTE — ED NOTES
Bed: 10  Expected date:   Expected time:   Means of arrival: Walk In  Comments:     Jordyn Roque RN  01/22/21 0787

## 2021-01-22 NOTE — ED PROVIDER NOTES
[facility]        Pt Name: Pauline Martinez  MRN: [id number]  [place] 1980  Date of evaluation: 1/22/2021  Provider: SUMIT Walker  PCP: No primary care provider on file. I have seen and evaluated this patient with my supervising physician Norris Neal       Chief Complaint   Patient presents with    Post-op Problem     hysterectomy wednesday, worsening pain today, burning on the R side,  no relief with home meds       HISTORY OF PRESENT ILLNESS   (Location, Timing/Onset, Context/Setting, Quality, Duration, Modifying Factors, Severity, Associated Signs and Symptoms)  Note limiting factors. Pauline Martinez is a 39 y.o. female with no significant past medical history of anemia and asthma who presents to the ED with complaint of a postop problem. Patient states had hysterectomy on Wednesday by OB/GYN, Dr. Lewis Fleischer. Patient states she was placed to have vaginal hysterectomy but had to be converted to abdominal hysterectomy. Patient states she was discharged home yesterday. States woke up today with increasing pain to her lower right abdomen. Patient that she has some bruising along the incision. States she saw OB/GYN who recommended she come to the ED for further evaluation and treatment. Has been taking her home pain medication with minimal improvement of symptoms. States sharp pain rated 10/10. Denies any radiation to the back. Patient denies any dysuria, frequency, urgency or vaginal discharge. Patient states she has been constipated has not had bowel movement since surgery. Denies nausea or vomiting. Denies fever chills. Denies rashes or lesions. Denies chest pain or shortness of breath. Nursing Notes were all reviewed and agreed with or any disagreements were addressed in the HPI.     REVIEW OF SYSTEMS    (2-9 systems for level 4, 10 or more for level 5)     Review of Systems   Constitutional: Negative for activity change, appetite change, chills, diaphoresis, fatigue and fever. Respiratory: Negative. Negative for cough, chest tightness and shortness of breath. Cardiovascular: Negative. Negative for chest pain, palpitations and leg swelling. Gastrointestinal: Positive for abdominal pain and constipation. Negative for diarrhea, nausea and vomiting. Genitourinary: Negative for decreased urine volume, difficulty urinating, dysuria, flank pain, frequency, hematuria and urgency. Musculoskeletal: Negative for arthralgias, back pain, myalgias, neck pain and neck stiffness. Skin: Negative for color change, pallor, rash and wound. Neurological: Negative for dizziness, light-headedness and headaches. Positives and Pertinent negatives as per HPI. Except as noted above in the ROS, all other systems were reviewed and negative. PAST MEDICAL HISTORY     Past Medical History:   Diagnosis Date    Anemia     Asthma          SURGICAL HISTORY     Past Surgical History:   Procedure Laterality Date     SECTION      x3    CYST REMOVAL      x2 from lower back    FOOT SURGERY      bilateral    HYSTERECTOMY  2021    HYSTERECTOMY ABDOMINAL performed by Charles Obreogn MD at [address] N[address]A 2021    ATTEMPTED TOTAL VAGINAL HYSTERECTOMY performed by Charles Obregon MD at [facility]       Discharge Medication List as of 2021  8:19 PM      CONTINUE these medications which have NOT CHANGED    Details   acetaminophen (TYLENOL) 500 MG tablet Take 1 tablet by mouth every 6 hours as needed for Pain, Disp-120 tablet, R-0Print      ibuprofen (ADVIL;MOTRIN) 600 MG tablet Take 1 tablet by mouth every 6 hours as needed for Pain, Disp-120 tablet, R-0Print      oxyCODONE (ROXICODONE) 5 MG immediate release tablet Take 1 tablet by mouth every 6 hours as needed for Pain for up to 7 days.  Intended supply: 5 days. Take lowest dose possible to manage pain, Disp-28 tablet, R-0Print               ALLERGIES     Patient has no known allergies. FAMILYHISTORY       Family History   Problem Relation Age of Onset    High Blood Pressure Mother           SOCIAL HISTORY       Social History     Tobacco Use    Smoking status: Current Every Day Smoker     Packs/day: 0.50     Years: 15.00     Pack years: 7.50     Types: Cigarettes    Smokeless tobacco: Never Used   Substance Use Topics    Alcohol use: Yes     Comment: 4-5 DRINKS PER WEEK    Drug use: Yes     Types: Marijuana     Comment: RARE       SCREENINGS             PHYSICAL EXAM    (up to 7 for level 4, 8 or more for level 5)     ED Triage Vitals [01/22/21 1610]   BP Temp Temp src Pulse Resp SpO2 Height Weight   (!) 101/57 97.5 °F (36.4 °C) -- 79 16 100 % -- --       Physical Exam  Constitutional:       General: She is not in acute distress. Appearance: Normal appearance. She is well-developed. She is not ill-appearing, toxic-appearing or diaphoretic. HENT:      Head: Normocephalic and atraumatic. Right Ear: External ear normal.      Left Ear: External ear normal.   Eyes:      General:         Right eye: No discharge. Left eye: No discharge. Neck:      Musculoskeletal: Normal range of motion and neck supple. Cardiovascular:      Rate and Rhythm: Normal rate and regular rhythm. Pulses: Normal pulses. Heart sounds: Normal heart sounds. No murmur. No friction rub. No gallop. Pulmonary:      Effort: Pulmonary effort is normal. No respiratory distress. Breath sounds: Normal breath sounds. No stridor. No wheezing, rhonchi or rales. Chest:      Chest wall: No tenderness. Abdominal:      General: Abdomen is flat. Bowel sounds are normal. There is no distension. Palpations: Abdomen is soft. There is no mass. Tenderness: There is abdominal tenderness. There is guarding.  There is no right CVA tenderness, left CVA tenderness or rebound. Hernia: No hernia is present. Comments: Low transverse surgical incision noted. Incision intact without dehiscence. There is some bruising noted to the right lateral wound. There is no induration or fluctuance. No bleeding or drainage. Has some voluntary guarding with palpation of the lower abdomen. No rebound. Musculoskeletal: Normal range of motion. Skin:     General: Skin is warm and dry. Coloration: Skin is not pale. Findings: No erythema. Neurological:      Mental Status: She is alert and oriented to person, place, and time. Psychiatric:         Behavior: Behavior normal.         DIAGNOSTIC RESULTS   LABS:    Labs Reviewed   CBC WITH AUTO DIFFERENTIAL - Abnormal; Notable for the following components:       Result Value    RBC 2.83 (*)     Hemoglobin 8.9 (*)     Hematocrit 27.6 (*)     RDW 20.7 (*)     All other components within normal limits    Narrative:     Performed at:  [facility] [address], [place], [email or web address]   Phone [phone]   BASIC METABOLIC PANEL - Abnormal; Notable for the following components:    BUN 5 (*)     CREATININE 0.5 (*)     All other components within normal limits    Narrative:     Performed at:  [facility] [address], [place], [facility] [employer]   Phone [phone]   URINE RT REFLEX TO CULTURE - Abnormal; Notable for the following components:    Clarity, UA CLOUDY (*)     Ketones, Urine >=80 (*)     Blood, Urine MODERATE (*)     Leukocyte Esterase, Urine SMALL (*)     All other components within normal limits    Narrative:     Performed at:  [facility] [address], [place], [email or web address]   Phone [phone]   HEPATIC FUNCTION PANEL - Abnormal; Notable for the following components:     Total Protein 6.0 (*)     AST 45 (*)     All other components within normal limits    Narrative:     Performed at:  [facility]  [address] [facility]  [place], [address]   Phone [phone]   LIPASE - Abnormal; Notable for the following components:    Lipase 8.0 (*)     All other components within normal limits    Narrative:     Performed at:  [facility]  [address] [facility],  [place], [address]   Phone [phone]   MICROSCOPIC URINALYSIS - Abnormal; Notable for the following components:    WBC, UA 9 (*)     Epithelial Cells, UA 6 (*)     All other components within normal limits    Narrative:     Performed at:  [facility]  [address] [facility],  [place], [address]   Phone [phone]       All other labs were within normal range or not returned as of this dictation. EKG: All EKG's are interpreted by the Emergency Department Physician in the absence of a cardiologist.  Please see their note for interpretation of EKG. RADIOLOGY:   Non-plain film images such as CT, Ultrasound and MRI are read by the radiologist. Plain radiographic images are visualized and preliminarily interpreted by the ED Provider with the below findings:        Interpretation per the Radiologist below, if available at the time of this note:    CT ABDOMEN PELVIS W IV CONTRAST Additional Contrast? None   Final Result   1. Postsurgical sequela related to hysterectomy appears within normal limits   as expected 2 days postoperative. 2. There is mild prominence of subcutaneous gas collection at the right   paramedian ventral pelvis which may be resulting in symptomatology described   by the patient, but the appearance is not unusual 2 days following   hysterectomy. 3. Nonspecific urinary bladder wall thickening may be related to urinary   bladder nondistention or cystitis. Correlate with urinalysis. 4. Hepatic steatosis. No results found.         PROCEDURES   Unless otherwise noted below, none     Procedures    CRITICAL CARE TIME   N/A    CONSULTS:  IP CONSULT TO OB GYN      EMERGENCY DEPARTMENT COURSE and DIFFERENTIAL DIAGNOSIS/MDM:   Vitals:    Vitals:    01/22/21 1610   BP: (!) 101/57   Pulse: 79   Resp: 16   Temp: 97.5 °F (36.4 °C)   SpO2: 100%       Patient was given the following medications:  Medications   HYDROmorphone HCl PF (DILAUDID) injection 0.5 mg (0.5 mg Intravenous Given 1/22/21 1917)   ondansetron (ZOFRAN) injection 4 mg (4 mg Intravenous Given 1/22/21 1916)   iopamidol (ISOVUE-370) 76 % injection 75 mL (75 mLs Intravenous Given 1/22/21 1838)   0.9 % sodium chloride bolus (0 mLs Intravenous Stopped 1/22/21 2028)           Patient is a 44-year-old female who presents to the ED with complaint of abdominal pain. Complaint of lower abdominal pain near her incision with 2 days status post abdominal hysterectomy. On examination incision appears to be intact with no signs of infection or dehiscence. There does appear to be some bruising noted along the incision. Tenderness over this area and pain with sitting up or movement. Patient suffering from what appears to be some postoperative pain status post abdominal hysterectomy. May potentially be just due to postoperative status at this time but sent over by OB/GYN for further imaging. Patient was given Dilaudid, Zofran and fluids here in the ED for symptom control. Urinalysis showed 9 white blood cells with 6 epithelial cells. Negative nitrite. Low suspicion for infection. CBC showed normal white count and platelets. Hemoglobin 8.9 actually improved from patient's recent hemoglobin when she was discharged. Low suspicion for continued bleeding. BMP unremarkable. Hepatic function relatively unremarkable. Lipase normal.  CT of the abdomen pelvis showed postsurgical sequela related to hysterectomy within normal limits as expected for 2 days postoperative. There does appear to be some subcutaneous gas collection at the right paramedian ventral pelvis likely resulting in symptomology described by patient. Not unusual per radiology read for 2 days status post hysterectomy. Bladder wall thickening noted which could be due to nondistention versus cystitis. Urinalysis as above and low suspicion for cystitis at this time. Apparently patient has had endometritis in the past with 2 previous C-sections. Given this we discussed case with on-call OB/GYN, Dr. Sabine Eddy, who felt patient could be discharged home for outpatient management. Continue home pain medication. Agreeable to 3-day prescription for Cipro. Patient be given Diflucan to take after the Cipro to prevent yeast infection. Follow-up with her OB/GYN. Return the ED for any worsening symptoms. Low suspicion for obstruction, peritonitis, pancreatitis, cholecystitis, appendicitis, diverticulitis, colitis, mesenteric ischemia, AAA, dissection, pyelonephritis, nephrolithiasis, TOA, PID, ovarian torsion, ectopic pregnancy, endometritis, cellulitis, abscess or other emergent etiology at this time. FINAL IMPRESSION      1. Post-operative pain    2. S/P abdominal hysterectomy          DISPOSITION/PLAN   DISPOSITION Decision To Discharge 01/22/2021 08:16:18 PM      PATIENT REFERREDTO:  [facility] Emergency Department  [address]  [address]  Go to   As needed, If symptoms worsen    Tobi Kim MD  [address] [place] [address]  [phone]    Schedule an appointment as soon as possible for a visit   For a Re-check in   3-5   days.       DISCHARGE MEDICATIONS:  Discharge Medication List as of 1/22/2021  8:19 PM      START taking these medications    Details   ciprofloxacin (CIPRO) 500 MG tablet Take 1 tablet by mouth 2 times daily for 3 days, Disp-6 tablet, R-0Print      fluconazole (DIFLUCAN) 150 MG tablet Take 1 tablet by mouth once for 1 dose After antibiotics, Disp-1 tablet, R-0Print             DISCONTINUED MEDICATIONS:  Discharge Medication List as of 1/22/2021  8:19 PM                 (Please note that portions of this note were completed with a voice recognition program.  Efforts were made to edit the dictations but occasionally words are mis-transcribed.)    SUMIT Enriquez (electronically signed)          SUMIT Davis  01/22/21 6045

## 2021-01-23 NOTE — ED NOTES
Pt Discharged in stable condition, VSS, no signs of distress, discharge instructions and meds reviewed. Pt verbalizes understanding and states no further questions or concerns unaddressed.        Noy Brice RN  01/22/21 2028

## 2021-01-23 NOTE — ED PROVIDER NOTES
I independently performed a history and physical on Adali Goel. All diagnostic, treatment, and disposition decisions were made by myself in conjunction with the advanced practice provider. Briefly, this is a 39 y.o. female here for abdominal pain. Had hysterectomy Wednesday with Dr. Ty Bañuelos. Now presents with abdominal pain. Sent here from her OB/GYN's office. Is having mild bright red vaginal bleeding. No fever or chills. No nausea or vomiting. Tolerating oral intake without issue. No dysuria. Pain uncontrolled at this time passing gas    On exam,   General: Patient is in no acute distress  Skin: No cyanosis  HEENT: Moist mucous membranes  Heart: Regular rate, regular rhythm  Lung: No respiratory distress  Abdomen: Soft, nontender  Neuro: Moving all extremities, no facial droop, no slurred speech, answers questions appropriately     Screenings            MDM  Patient is a 57-year-old woman who is postop day 2 from abdominal hysterectomy and presents with abdominal pain. May be secondary to postop pain versus endometritis. CT shows evidence of subcu air consistent with recent surgery. She is well-appearing and not tachycardic. I do not believe that she is septic. Will discuss with Dr. Ty Bañuelos. Spoke with Dr. Emmanuel Hilliard at 750pm.  Patient's vital signs unremarkable. Blood pressures are similar to what she was at at discharge. No leukocytosis. She agrees that patient's CT findings are likely postop in nature and not due to endometriitis. As a precaution, she would request starting on a few days of ciprofloxacin and outpatient follow-up.   Will get patient's pain under control and make sure she can tolerate oral intake and anticipate discharge if this is complete    Patient Referrals:  [facility] Emergency Department  [address] [place] [address]  [phone]  Go to   As needed, If symptoms worsen    Mariana Aquino MD  [address] [facility] [address]  [phone]    Schedule an appointment as soon as possible for a visit   For a Re-check in   3-5   days. Discharge Medications:  Discharge Medication List as of 1/22/2021  8:19 PM      START taking these medications    Details   ciprofloxacin (CIPRO) 500 MG tablet Take 1 tablet by mouth 2 times daily for 3 days, Disp-6 tablet, R-0Print      fluconazole (DIFLUCAN) 150 MG tablet Take 1 tablet by mouth once for 1 dose After antibiotics, Disp-1 tablet, R-0Print             FINAL IMPRESSION  1. Post-operative pain    2. S/P abdominal hysterectomy        Blood pressure (!) 101/57, pulse 79, temperature 97.5 °F (36.4 °C), resp. rate 16, last menstrual period 01/14/2021, SpO2 100 %, not currently breastfeeding. For further details of [facility] emergency department encounter, please see documentation by advanced practice providerDeonte.        Caity Hearn MD  01/22/21 980

## 2025-07-08 ENCOUNTER — APPOINTMENT (OUTPATIENT)
Dept: CT IMAGING | Age: 45
End: 2025-07-08
Payer: COMMERCIAL

## 2025-07-08 ENCOUNTER — HOSPITAL ENCOUNTER (EMERGENCY)
Age: 45
Discharge: HOME | End: 2025-07-09
Attending: STUDENT IN AN ORGANIZED HEALTH CARE EDUCATION/TRAINING PROGRAM
Payer: COMMERCIAL

## 2025-07-08 DIAGNOSIS — R10.31 ABDOMINAL PAIN, RIGHT LOWER QUADRANT: Primary | ICD-10-CM

## 2025-07-08 DIAGNOSIS — K76.9 LIVER LESION: ICD-10-CM

## 2025-07-08 LAB
ALBUMIN SERPL-MCNC: 4.4 G/DL (ref 3.4–5)
ALBUMIN/GLOB SERPL: 1.5 {RATIO} (ref 1.1–2.2)
ALP SERPL-CCNC: 93 U/L (ref 40–129)
ALT SERPL-CCNC: 108 U/L (ref 10–40)
ANION GAP SERPL CALCULATED.3IONS-SCNC: 14 MMOL/L (ref 3–16)
AST SERPL-CCNC: 244 U/L (ref 15–37)
BACTERIA URNS QL MICRO: ABNORMAL /HPF
BASOPHILS # BLD: 0 K/UL (ref 0–0.2)
BASOPHILS NFR BLD: 0.5 %
BILIRUB SERPL-MCNC: 1.3 MG/DL (ref 0–1)
BILIRUB UR QL STRIP.AUTO: ABNORMAL
BUN SERPL-MCNC: 9 MG/DL (ref 7–20)
CALCIUM SERPL-MCNC: 9.4 MG/DL (ref 8.3–10.6)
CHLORIDE SERPL-SCNC: 102 MMOL/L (ref 99–110)
CLARITY UR: CLEAR
CO2 SERPL-SCNC: 24 MMOL/L (ref 21–32)
COLOR UR: ABNORMAL
CREAT SERPL-MCNC: 0.7 MG/DL (ref 0.6–1.1)
DEPRECATED RDW RBC AUTO: 13.4 % (ref 12.4–15.4)
EOSINOPHIL # BLD: 0.1 K/UL (ref 0–0.6)
EOSINOPHIL NFR BLD: 0.8 %
EPI CELLS #/AREA URNS AUTO: 12 /HPF (ref 0–5)
GFR SERPLBLD CREATININE-BSD FMLA CKD-EPI: >90 ML/MIN/{1.73_M2}
GLUCOSE SERPL-MCNC: 100 MG/DL (ref 70–99)
GLUCOSE UR STRIP.AUTO-MCNC: NEGATIVE MG/DL
HCT VFR BLD AUTO: 37.8 % (ref 36–48)
HGB BLD-MCNC: 13.2 G/DL (ref 12–16)
HGB UR QL STRIP.AUTO: NEGATIVE
HYALINE CASTS #/AREA URNS AUTO: 0 /LPF (ref 0–8)
KETONES UR STRIP.AUTO-MCNC: ABNORMAL MG/DL
LEUKOCYTE ESTERASE UR QL STRIP.AUTO: ABNORMAL
LIPASE SERPL-CCNC: 22 U/L (ref 13–60)
LYMPHOCYTES # BLD: 2 K/UL (ref 1–5.1)
LYMPHOCYTES NFR BLD: 31.3 %
MCH RBC QN AUTO: 37.8 PG (ref 26–34)
MCHC RBC AUTO-ENTMCNC: 35 G/DL (ref 31–36)
MCV RBC AUTO: 108.2 FL (ref 80–100)
MONOCYTES # BLD: 0.5 K/UL (ref 0–1.3)
MONOCYTES NFR BLD: 8.1 %
NEUTROPHILS # BLD: 3.7 K/UL (ref 1.7–7.7)
NEUTROPHILS NFR BLD: 59.3 %
NITRITE UR QL STRIP.AUTO: NEGATIVE
PH UR STRIP.AUTO: 7 [PH] (ref 5–8)
PLATELET # BLD AUTO: 192 K/UL (ref 135–450)
PMV BLD AUTO: 8 FL (ref 5–10.5)
POTASSIUM SERPL-SCNC: 3.4 MMOL/L (ref 3.5–5.1)
PROT SERPL-MCNC: 7.4 G/DL (ref 6.4–8.2)
PROT UR STRIP.AUTO-MCNC: ABNORMAL MG/DL
RBC # BLD AUTO: 3.5 M/UL (ref 4–5.2)
RBC CLUMPS #/AREA URNS AUTO: 7 /HPF (ref 0–4)
SODIUM SERPL-SCNC: 140 MMOL/L (ref 136–145)
SP GR UR STRIP.AUTO: 1.02 (ref 1–1.03)
UA COMPLETE W REFLEX CULTURE PNL UR: ABNORMAL
UA DIPSTICK W REFLEX MICRO PNL UR: YES
URN SPEC COLLECT METH UR: ABNORMAL
UROBILINOGEN UR STRIP-ACNC: 2 E.U./DL
WBC # BLD AUTO: 6.3 K/UL (ref 4–11)
WBC #/AREA URNS AUTO: 5 /HPF (ref 0–5)

## 2025-07-08 PROCEDURE — 96375 TX/PRO/DX INJ NEW DRUG ADDON: CPT

## 2025-07-08 PROCEDURE — 2580000003 HC RX 258: Performed by: PHYSICIAN ASSISTANT

## 2025-07-08 PROCEDURE — 74177 CT ABD & PELVIS W/CONTRAST: CPT

## 2025-07-08 PROCEDURE — 6360000004 HC RX CONTRAST MEDICATION: Performed by: STUDENT IN AN ORGANIZED HEALTH CARE EDUCATION/TRAINING PROGRAM

## 2025-07-08 PROCEDURE — 83690 ASSAY OF LIPASE: CPT

## 2025-07-08 PROCEDURE — 80053 COMPREHEN METABOLIC PANEL: CPT

## 2025-07-08 PROCEDURE — 81001 URINALYSIS AUTO W/SCOPE: CPT

## 2025-07-08 PROCEDURE — 96374 THER/PROPH/DIAG INJ IV PUSH: CPT

## 2025-07-08 PROCEDURE — 99285 EMERGENCY DEPT VISIT HI MDM: CPT

## 2025-07-08 PROCEDURE — 6360000002 HC RX W HCPCS: Performed by: PHYSICIAN ASSISTANT

## 2025-07-08 PROCEDURE — 85025 COMPLETE CBC W/AUTO DIFF WBC: CPT

## 2025-07-08 RX ORDER — ONDANSETRON 2 MG/ML
4 INJECTION INTRAMUSCULAR; INTRAVENOUS ONCE
Status: COMPLETED | OUTPATIENT
Start: 2025-07-08 | End: 2025-07-08

## 2025-07-08 RX ORDER — 0.9 % SODIUM CHLORIDE 0.9 %
1000 INTRAVENOUS SOLUTION INTRAVENOUS ONCE
Status: COMPLETED | OUTPATIENT
Start: 2025-07-08 | End: 2025-07-09

## 2025-07-08 RX ORDER — MORPHINE SULFATE 4 MG/ML
4 INJECTION, SOLUTION INTRAMUSCULAR; INTRAVENOUS ONCE
Refills: 0 | Status: COMPLETED | OUTPATIENT
Start: 2025-07-08 | End: 2025-07-08

## 2025-07-08 RX ADMIN — SODIUM CHLORIDE 1000 ML: 0.9 INJECTION, SOLUTION INTRAVENOUS at 23:44

## 2025-07-08 RX ADMIN — IOHEXOL 75 ML: 350 INJECTION, SOLUTION INTRAVENOUS at 23:50

## 2025-07-08 RX ADMIN — ONDANSETRON 4 MG: 2 INJECTION, SOLUTION INTRAMUSCULAR; INTRAVENOUS at 23:14

## 2025-07-08 RX ADMIN — MORPHINE SULFATE 4 MG: 4 INJECTION INTRAVENOUS at 23:43

## 2025-07-08 ASSESSMENT — ENCOUNTER SYMPTOMS
DIARRHEA: 0
SHORTNESS OF BREATH: 0
COUGH: 0
NAUSEA: 1
WHEEZING: 0
VOMITING: 1
ABDOMINAL PAIN: 1
RHINORRHEA: 0

## 2025-07-08 ASSESSMENT — PAIN - FUNCTIONAL ASSESSMENT: PAIN_FUNCTIONAL_ASSESSMENT: 0-10

## 2025-07-08 ASSESSMENT — PAIN DESCRIPTION - ORIENTATION: ORIENTATION: RIGHT

## 2025-07-08 ASSESSMENT — PAIN SCALES - GENERAL
PAINLEVEL_OUTOF10: 9
PAINLEVEL_OUTOF10: 8

## 2025-07-08 ASSESSMENT — PAIN DESCRIPTION - LOCATION: LOCATION: ABDOMEN

## 2025-07-09 VITALS
DIASTOLIC BLOOD PRESSURE: 83 MMHG | HEART RATE: 66 BPM | SYSTOLIC BLOOD PRESSURE: 134 MMHG | TEMPERATURE: 98.1 F | WEIGHT: 119.4 LBS | BODY MASS INDEX: 21.97 KG/M2 | RESPIRATION RATE: 17 BRPM | HEIGHT: 62 IN | OXYGEN SATURATION: 97 %

## 2025-07-09 PROCEDURE — 6370000000 HC RX 637 (ALT 250 FOR IP): Performed by: STUDENT IN AN ORGANIZED HEALTH CARE EDUCATION/TRAINING PROGRAM

## 2025-07-09 RX ORDER — OXYCODONE HYDROCHLORIDE 5 MG/1
5 TABLET ORAL ONCE
Refills: 0 | Status: COMPLETED | OUTPATIENT
Start: 2025-07-09 | End: 2025-07-09

## 2025-07-09 RX ADMIN — OXYCODONE 5 MG: 5 TABLET ORAL at 03:32

## 2025-07-09 ASSESSMENT — PAIN SCALES - GENERAL: PAINLEVEL_OUTOF10: 3

## 2025-07-09 NOTE — DISCHARGE INSTRUCTIONS
It is mandatory that you follow up with your primary care provider and gastroenterology to ensure resolution/improvement of your symptoms.  If you do not have a primary care provider, please see discharge paperwork for instructions to contact [facility], who are currently accepting new patients.    Please see your discharge paperwork for information to contact Dr. Lofton with gastroenterology.  Alternatively, please schedule an appointment with a specialists of this field with whom you have an existing relationship.    MANDATORY return to the emergency department within 12-24 hours unless you are better.  If you feel worse or have any other concerns, return sooner.    If you experience any of the red flag signs/symptoms that we discussed, please return to the emergency department or call 911 immediately.    Please take medications as we discussed.

## 2025-07-09 NOTE — ED NOTES
Pt discharged in stable conditio, AVS and follow up care reviewed , IV removed, all questions answered.

## 2025-07-09 NOTE — ED PROVIDER NOTES
[facility] EMERGENCY DEPARTMENT  EMERGENCY DEPARTMENT ENCOUNTER        Pt Name: Brittney Boyd  MRN: [id number]  Birthdate 1980  Date of evaluation: 2025  Provider: Sharita Hardy PA-C  PCP: No primary care provider on file.  Note Started: 10:59 PM EDT 25       I have seen and evaluated this patient with my supervising physician Kike Noble DO.      CHIEF COMPLAINT       Chief Complaint   Patient presents with    Abdominal Pain     Patient is c/o RLQ pain that started last night but has gotten worse        HISTORY OF PRESENT ILLNESS: 1 or more Elements     History From: patient   Limitations to history : None    Brittney Boyd is a 45 y.o. female who presents for evaluation of right lower quadrant abdominal pain.  Patient states that it for started 2 days ago it was dull and more dull and achy and is now severe and constant with associated anorexia, nausea and vomiting.  No diarrhea or bloody stools.  History of total hysterectomy and  x 3.  No other abdominal surgeries.  No known sick contacts with similar symptoms.  She denies any urinary complaints.  She has no other complaints or concerns at this time.    Nursing Notes were all reviewed and agreed with or any disagreements were addressed in the HPI.    REVIEW OF SYSTEMS :      Review of Systems   Constitutional:  Negative for appetite change, chills and fever.   HENT:  Negative for congestion and rhinorrhea.    Respiratory:  Negative for cough, shortness of breath and wheezing.    Cardiovascular:  Negative for chest pain.   Gastrointestinal:  Positive for abdominal pain, nausea and vomiting. Negative for diarrhea.   Genitourinary:  Negative for difficulty urinating, dysuria and hematuria.   Musculoskeletal:  Negative for neck pain and neck stiffness.   Skin:  Negative for rash.   Neurological:  Negative for headaches.       Positives and Pertinent negatives as per HPI.     SURGICAL HISTORY     Past Surgical History: 
14 3 - 16    Glucose 100 (H) 70 - 99 mg/dL    BUN 9 7 - 20 mg/dL    Creatinine 0.7 0.6 - 1.1 mg/dL    Est, Glom Filt Rate >90 >60    Calcium 9.4 8.3 - 10.6 mg/dL    Total Protein 7.4 6.4 - 8.2 g/dL    Albumin 4.4 3.4 - 5.0 g/dL    Albumin/Globulin Ratio 1.5 1.1 - 2.2    Total Bilirubin 1.3 (H) 0.0 - 1.0 mg/dL    Alkaline Phosphatase 93 40 - 129 U/L     (H) 10 - 40 U/L     (H) 15 - 37 U/L   Lipase   Result Value Ref Range    Lipase 22.0 13.0 - 60.0 U/L   Urinalysis with Reflex to Culture    Specimen: Urine   Result Value Ref Range    Color, UA DARK YELLOW (A) Straw/Yellow    Clarity, UA Clear Clear    Glucose, Ur Negative Negative mg/dL    Bilirubin, Urine SMALL (A) Negative    Ketones, Urine TRACE (A) Negative mg/dL    Specific Gravity, UA 1.025 1.005 - 1.030    Blood, Urine Negative Negative    pH, Urine 7.0 5.0 - 8.0    Protein, UA TRACE (A) Negative mg/dL    Urobilinogen, Urine 2.0 (A) <2.0 E.U./dL    Nitrite, Urine Negative Negative    Leukocyte Esterase, Urine SMALL (A) Negative    Microscopic Examination YES     Urine Type NotGiven     Urine Reflex to Culture Not Indicated    Microscopic Urinalysis   Result Value Ref Range    Bacteria, UA 1+ (A) None Seen /HPF    Hyaline Casts, UA 0 0 - 8 /LPF    WBC, UA 5 0 - 5 /HPF    RBC, UA 7 (H) 0 - 4 /HPF    Epithelial Cells, UA 12 (H) 0 - 5 /HPF         IMAGING RESULTS     CT ABDOMEN PELVIS W IV CONTRAST Additional Contrast? None   Final Result   1. No acute intra-abdominal or pelvic process.   2. Several low-density lesions in the liver may represent cysts or   hemangiomas.         Any applicable radiology studies including x-ray, CT, MRI, and/or ultrasound were reviewed independently by me in addition to the radiologist.  I reviewed all radiology images and reports as well from this evaluation.        MEDICAL DECISION MAKING     In addition to the advanced practice provider, I personally saw Brittney Boyd and performed a substantive portion of the visit

## (undated) DEVICE — BASIC SINGLE BASIN 1-LF: Brand: MEDLINE INDUSTRIES, INC.

## (undated) DEVICE — SPONGE LAP W18XL18IN WHT COT 4 PLY FLD STRUNG RADPQ DISP ST

## (undated) DEVICE — SOLUTION IV IRRIG POUR BRL 0.9% SODIUM CHL 2F7124

## (undated) DEVICE — CONTROL SYRINGE LUER-LOCK TIP: Brand: MONOJECT

## (undated) DEVICE — COVER,MAYO STAND,STERILE: Brand: MEDLINE

## (undated) DEVICE — INTENDED FOR TISSUE SEPARATION, AND OTHER PROCEDURES THAT REQUIRE A SHARP SURGICAL BLADE TO PUNCTURE OR CUT.: Brand: BARD-PARKER ® STAINLESS STEEL BLADES

## (undated) DEVICE — SUTURE VCRL SZ 0 L18IN ABSRB VLT L26MM CT-2 1/2 CIR J727D

## (undated) DEVICE — COVER LT HNDL BLU PLAS

## (undated) DEVICE — PAD,NON-ADHERENT,3X8,STERILE,LF,1/PK: Brand: MEDLINE

## (undated) DEVICE — PENCIL ES L3M BTTN SWCH S STL HEX LOK BLDE ELECTRD HOLSTER

## (undated) DEVICE — VAG HYSTERECTOMY-LF: Brand: MEDLINE INDUSTRIES, INC.

## (undated) DEVICE — SUTURE COAT VCRL SZ 0 L18IN ABSRB UD W/O NDL POLYGLACTIN J112T

## (undated) DEVICE — MERCY FAIRFIELD TURNOVER KIT: Brand: MEDLINE INDUSTRIES, INC.

## (undated) DEVICE — SUTURE VCRL SZ 0 L36IN ABSRB UD L36MM CT-1 1/2 CIR J946H

## (undated) DEVICE — YANKAUER,BULB TIP,W/O VENT,RIGID,STERILE: Brand: MEDLINE

## (undated) DEVICE — CATHETER ETER URIN INTMIT NELATON RED RUB 16 FR

## (undated) DEVICE — BLANKET WRM W29.9XL79.1IN UP BODY FORC AIR MISTRAL-AIR

## (undated) DEVICE — SUTURE ABSORBABLE BRAIDED 0 CT-1 8X27 IN UD VICRYL JJ41G

## (undated) DEVICE — TUBING, SUCTION, 1/4" X 10', STRAIGHT: Brand: MEDLINE

## (undated) DEVICE — GOWN,AURORA,NONREINF,RAGLAN,XXL,STERILE: Brand: MEDLINE

## (undated) DEVICE — TOTAL TRAY, DB, 100% SILI FOLEY, 16FR 10: Brand: MEDLINE

## (undated) DEVICE — SUTURE VCRL SZ 3-0 L36IN ABSRB UD L36MM CT-1 1/2 CIR J944H

## (undated) DEVICE — DECANTER FLD 9IN ST BG FOR ASEP TRNSF OF FLD

## (undated) DEVICE — ADHESIVE SKIN CLSR 0.7ML TOP DERMBND ADV

## (undated) DEVICE — NEEDLE HYPO 22GA L1.5IN BLK POLYPR HUB S STL REG BVL STR

## (undated) DEVICE — DRAPE SURG L39XW46IN PERI OB

## (undated) DEVICE — STERILE LATEX POWDER-FREE SURGICAL GLOVESWITH NITRILE COATING: Brand: PROTEXIS

## (undated) DEVICE — GAUZE,SPONGE,4"X4",8PLY,STRL,LF,10/TRAY: Brand: MEDLINE

## (undated) DEVICE — TRAY PREP DRY W/ PREM GLV 2 APPL 6 SPNG 2 UNDPD 1 OVERWRAP

## (undated) DEVICE — GAUZE,SPONGE,4"X4",16PLY,XRAY,STRL,LF: Brand: MEDLINE

## (undated) DEVICE — SUTURE PDS II SZ 0 L36IN ABSRB VLT L36MM CT-1 1/2 CIR Z346H

## (undated) DEVICE — SUTURE MCRYL SZ 4-0 L27IN ABSRB UD L19MM PS-2 1/2 CIR PRIM Y426H

## (undated) DEVICE — PAD,ABDOMINAL,8"X10",ST,LF: Brand: MEDLINE